# Patient Record
Sex: FEMALE | Race: WHITE | Employment: UNEMPLOYED | URBAN - METROPOLITAN AREA
[De-identification: names, ages, dates, MRNs, and addresses within clinical notes are randomized per-mention and may not be internally consistent; named-entity substitution may affect disease eponyms.]

---

## 2017-05-24 ENCOUNTER — HOSPITAL ENCOUNTER (OUTPATIENT)
Facility: HOSPITAL | Age: 52
Setting detail: OBSERVATION
Discharge: HOME | End: 2017-05-26
Attending: EMERGENCY MEDICINE | Admitting: INTERNAL MEDICINE
Payer: COMMERCIAL

## 2017-05-24 ENCOUNTER — TELEPHONE (OUTPATIENT)
Dept: MEDSURG UNIT | Facility: HOSPITAL | Age: 52
End: 2017-05-24

## 2017-05-24 DIAGNOSIS — G43.911 INTRACTABLE MIGRAINE WITH STATUS MIGRAINOSUS, UNSPECIFIED MIGRAINE TYPE: Primary | ICD-10-CM

## 2017-05-24 PROBLEM — Z91.81 AT RISK FOR FALLING: Status: ACTIVE | Noted: 2017-05-24

## 2017-05-24 RX ORDER — ONDANSETRON 2 MG/ML
4 INJECTION INTRAMUSCULAR; INTRAVENOUS EVERY 4 HOURS PRN
Status: DISCONTINUED | OUTPATIENT
Start: 2017-05-24 | End: 2017-05-26

## 2017-05-24 RX ORDER — ONDANSETRON 2 MG/ML
4 INJECTION INTRAMUSCULAR; INTRAVENOUS ONCE
Status: COMPLETED | OUTPATIENT
Start: 2017-05-24 | End: 2017-05-24

## 2017-05-24 RX ORDER — HYDROMORPHONE HYDROCHLORIDE 1 MG/ML
0.5 INJECTION, SOLUTION INTRAMUSCULAR; INTRAVENOUS; SUBCUTANEOUS EVERY 30 MIN PRN
Status: ACTIVE | OUTPATIENT
Start: 2017-05-24 | End: 2017-05-24

## 2017-05-24 RX ORDER — HYDROMORPHONE HYDROCHLORIDE 1 MG/ML
1 INJECTION, SOLUTION INTRAMUSCULAR; INTRAVENOUS; SUBCUTANEOUS ONCE
Status: COMPLETED | OUTPATIENT
Start: 2017-05-24 | End: 2017-05-24

## 2017-05-24 RX ORDER — GABAPENTIN 300 MG/1
300 CAPSULE ORAL 3 TIMES DAILY
Status: DISCONTINUED | OUTPATIENT
Start: 2017-05-24 | End: 2017-05-26

## 2017-05-24 RX ORDER — TOPIRAMATE 25 MG/1
75 TABLET ORAL EVERY 12 HOURS SCHEDULED
Status: DISCONTINUED | OUTPATIENT
Start: 2017-05-24 | End: 2017-05-26

## 2017-05-24 RX ORDER — CYCLOBENZAPRINE HCL 10 MG
10 TABLET ORAL NIGHTLY
Status: DISCONTINUED | OUTPATIENT
Start: 2017-05-24 | End: 2017-05-26

## 2017-05-24 RX ORDER — KETOROLAC TROMETHAMINE 30 MG/ML
15 INJECTION, SOLUTION INTRAMUSCULAR; INTRAVENOUS ONCE
Status: COMPLETED | OUTPATIENT
Start: 2017-05-24 | End: 2017-05-24

## 2017-05-24 RX ORDER — PANTOPRAZOLE SODIUM 40 MG/1
40 TABLET, DELAYED RELEASE ORAL
Status: DISCONTINUED | OUTPATIENT
Start: 2017-05-25 | End: 2017-05-24

## 2017-05-24 RX ORDER — SODIUM CHLORIDE 9 MG/ML
INJECTION, SOLUTION INTRAVENOUS CONTINUOUS
Status: DISCONTINUED | OUTPATIENT
Start: 2017-05-24 | End: 2017-05-26

## 2017-05-24 RX ORDER — DEXAMETHASONE SODIUM PHOSPHATE 4 MG/ML
10 VIAL (ML) INJECTION ONCE
Status: COMPLETED | OUTPATIENT
Start: 2017-05-24 | End: 2017-05-24

## 2017-05-24 RX ORDER — PANTOPRAZOLE SODIUM 40 MG/1
40 TABLET, DELAYED RELEASE ORAL
Status: DISCONTINUED | OUTPATIENT
Start: 2017-05-24 | End: 2017-05-26

## 2017-05-24 RX ORDER — DIPHENHYDRAMINE HYDROCHLORIDE 50 MG/ML
25 INJECTION INTRAMUSCULAR; INTRAVENOUS ONCE
Status: COMPLETED | OUTPATIENT
Start: 2017-05-24 | End: 2017-05-24

## 2017-05-24 RX ORDER — METHYLPREDNISOLONE SODIUM SUCCINATE 125 MG/2ML
125 INJECTION, POWDER, LYOPHILIZED, FOR SOLUTION INTRAMUSCULAR; INTRAVENOUS ONCE
Status: COMPLETED | OUTPATIENT
Start: 2017-05-24 | End: 2017-05-24

## 2017-05-24 RX ORDER — ZOLPIDEM TARTRATE 10 MG/1
10 TABLET ORAL NIGHTLY PRN
Status: DISCONTINUED | OUTPATIENT
Start: 2017-05-24 | End: 2017-05-26

## 2017-05-24 RX ORDER — LORAZEPAM 2 MG/ML
1 INJECTION INTRAMUSCULAR ONCE
Status: COMPLETED | OUTPATIENT
Start: 2017-05-24 | End: 2017-05-24

## 2017-05-24 RX ORDER — DICYCLOMINE HYDROCHLORIDE 10 MG/1
10 CAPSULE ORAL
COMMUNITY
End: 2017-05-30

## 2017-05-24 RX ORDER — HYDROMORPHONE HYDROCHLORIDE 1 MG/ML
0.5 INJECTION, SOLUTION INTRAMUSCULAR; INTRAVENOUS; SUBCUTANEOUS EVERY 2 HOUR PRN
Status: COMPLETED | OUTPATIENT
Start: 2017-05-24 | End: 2017-05-25

## 2017-05-24 RX ORDER — ENOXAPARIN SODIUM 100 MG/ML
40 INJECTION SUBCUTANEOUS DAILY
Status: DISCONTINUED | OUTPATIENT
Start: 2017-05-24 | End: 2017-05-26

## 2017-05-24 NOTE — PLAN OF CARE
Problem: PAIN - ADULT  Goal: Verbalizes/displays adequate comfort level or patient’s stated pain goal  INTERVENTIONS:  - Encourage pt to monitor pain and request assistance  - Assess pain using appropriate pain scale  - Administer analgesics based on type to be responsible for managing their own health  - Refer to Case Management Department for coordinating discharge planning if the patient needs post-hospital services based on physician/LIP order or complex needs related to functional status, cognitive ramsey aphasic patients to use assistive/communication devices  Outcome: Progressing    Problem: GASTROINTESTINAL - ADULT  Goal: Minimal or absence of nausea and vomiting  INTERVENTIONS:  - Maintain adequate hydration with IV or PO as ordered and tolerated  - Julien

## 2017-05-24 NOTE — ED INITIAL ASSESSMENT (HPI)
Patient is a 55 yo  female with c/o migraine over the last 2 weeks. Patient states that she was evaluated at [facility] on Friday. Patient states that she was given zofran, toradol, and benadryl with minimal relief.  Patient rates pain as

## 2017-05-24 NOTE — ED NOTES
Round on pt. Pt requesting more blankets. Lights remain off in room.  Headache pain remains the same

## 2017-05-24 NOTE — ED PROVIDER NOTES
Patient Seen in: [facility] Emergency Department    History   Patient presents with:  Headache (neurologic)    Stated Complaint: migraine    HPI    42-year-old female complaining of headache on the patient has a history of chronic migraine headaches COCCYX;  Surgeon: Melanie Ashley MD;  Location: [facility]    M-SEDAJ BY [facility] [address] [facility] [address]  [address] [facility] 5+ YR N/A 8/19/2015    Comment Procedure: COCCYX;  Surgeon: Melanie Ashley MD;  Location: [address] [place] [address] Smoker                      Smokeless Status: Never Used                        Alcohol Use: No                Review of Systems    Positive for stated complaint: migraine  Other systems are as noted in HPI. Constitutional and vital signs reviewed.       A Dilaudid and I contacted Asad Avendnao from neurology who recommended IV Depakote as well as magnesium the patient became upset because it would not give her a second dose of Dilaudid which was very briefly after getting the first dose of Dilaudid and refuse

## 2017-05-25 RX ORDER — HYDROCODONE BITARTRATE AND ACETAMINOPHEN 5; 325 MG/1; MG/1
1 TABLET ORAL EVERY 4 HOURS PRN
Status: DISCONTINUED | OUTPATIENT
Start: 2017-05-25 | End: 2017-05-26

## 2017-05-25 RX ORDER — ALPRAZOLAM 0.5 MG/1
0.5 TABLET ORAL AS NEEDED
Status: DISCONTINUED | OUTPATIENT
Start: 2017-05-25 | End: 2017-05-25

## 2017-05-25 RX ORDER — ESCITALOPRAM OXALATE 10 MG/1
20 TABLET ORAL DAILY
Status: DISCONTINUED | OUTPATIENT
Start: 2017-05-25 | End: 2017-05-26

## 2017-05-25 RX ORDER — DICYCLOMINE HYDROCHLORIDE 10 MG/1
10 CAPSULE ORAL
Status: DISCONTINUED | OUTPATIENT
Start: 2017-05-25 | End: 2017-05-26

## 2017-05-25 RX ORDER — BUPROPION HYDROCHLORIDE 150 MG/1
150 TABLET, EXTENDED RELEASE ORAL
Status: DISCONTINUED | OUTPATIENT
Start: 2017-05-25 | End: 2017-05-26

## 2017-05-25 RX ORDER — LORAZEPAM 2 MG/ML
2 INJECTION INTRAMUSCULAR ONCE
Status: COMPLETED | OUTPATIENT
Start: 2017-05-25 | End: 2017-05-25

## 2017-05-25 RX ORDER — HYDROCODONE BITARTRATE AND ACETAMINOPHEN 5; 325 MG/1; MG/1
2 TABLET ORAL EVERY 4 HOURS PRN
Status: DISCONTINUED | OUTPATIENT
Start: 2017-05-25 | End: 2017-05-26

## 2017-05-25 RX ORDER — PANTOPRAZOLE SODIUM 40 MG/1
40 TABLET, DELAYED RELEASE ORAL
Status: DISCONTINUED | OUTPATIENT
Start: 2017-05-25 | End: 2017-05-25

## 2017-05-25 RX ORDER — ALPRAZOLAM 0.5 MG/1
0.5 TABLET ORAL 2 TIMES DAILY PRN
Status: DISCONTINUED | OUTPATIENT
Start: 2017-05-25 | End: 2017-05-26

## 2017-05-25 NOTE — PROGRESS NOTES
Pt states she takes Topamax 25mg TID. RN attempted to contact pts pharmacy to verify Rx, however they were closed. Will attempt at 1000 when pharmacy opens. 1038: RN spoke with pts' pharmacy. Topamax is prescribed as 75mg BID.

## 2017-05-25 NOTE — PLAN OF CARE
Altered Communication/Language Barrier    • Patient/Family is able to understand and participate in their care Progressing        GASTROINTESTINAL - ADULT    • Minimal or absence of nausea and vomiting Progressing        NEUROLOGICAL - ADULT    • Achieves

## 2017-05-25 NOTE — PLAN OF CARE
A/Ox4. Flat affect, withdrawn. Slightly drowsy. C/o HA. New orders for Norco for pain mgt. Had Mag 2gm IV this AM. Also receiving IV steroids. IVF infusing. Up with assist, slightly unsteady on her feet. Staff will cont to monitor.      1232: Rounded

## 2017-05-25 NOTE — PROGRESS NOTES
Neurology follow up NOTE    SUBJECTIVE:  She did not sleep well last night even with Ambien. Her headache has improved but still has right frontal region pain. She is eating her dinner.  She slept during the day until 4:30 pm. She did tell me she has a lot tomorrow.

## 2017-05-25 NOTE — PLAN OF CARE
Pt transported to  3619 via wheelchair accompanied by transport staff. Chart sent. Medications sent. Belongings sent.

## 2017-05-25 NOTE — PROGRESS NOTES
RECEIVED FROM SAME DAY SURGERY. PT A&OX4. C/O OF HEADACHE/BACK PAIN (CHRONIC). IVF INFUSING AS ORDERED. PT ORIENTED TO ROOM AND USE OF CALL LIGHT. VSS. FALL PRECAUTIONS INITIATED. WILL CONTINUE TO MONITOR. LABS AND MEDS AS ORDERED.  PAIN MEDS AS ORDERED P

## 2017-05-25 NOTE — PAYOR COMM NOTE
Attending Physician: Cornell Machado,*    5/24    Ed       Patient presents with:  Headache (neurologic)    Stated Complaint: migraine    HPI    68-year-old female complaining of headache on the patient has a history of chronic migraine headaches s Glucose 108 (*)     Chloride 112 (*)     CO2 20.0 (*)     All other components within normal limits   BASIC METABOLIC PANEL (8) - Abnormal; Notable for the following:     Glucose 180 (*)     Chloride 113 (*)     CO2 21.0 (*)     All other components wit

## 2017-05-25 NOTE — BH PROGRESS NOTE
[facility] Resource Referral Counselor Note    Author Prime Patient Status:  Observation    1965 MRN [id number]   [facility] 3NE-A Attending Tejas Arndt,*   Hosp Day # 1 PCP None Pcp       S(subjective) Pt

## 2017-05-25 NOTE — PROGRESS NOTES
[facility] Hospitalist Progress Note                                                                   [place] 46  8/25/1965    CC: FU HA    Interval History:  - HA is a bit improved today LN in the left axilla and mild tenderness to palpation bottom of the left breast, no hard mass palpated      Pertinent Labs:   Recent Labs   Lab  05/25/17   0535   RBC  4.22   HGB  12.4   HCT  38.2   MCV  90.5   MCH  29.4   MCHC  32.5   RDW  12.7   NEPRELI

## 2017-05-25 NOTE — CONSULTS
[facility]    PATIENT'S NAME: Brenna Thompson   ATTENDING PHYSICIAN: Angelica Sarmiento. HERMILA Reeves Alexandrea: Jyothi Escamilla M.D.    PATIENT ACCOUNT#:   [de-identified]    LOCATION:  [address] [place] [address]  MEDICAL RECORD #:   [id number]       DATE OF different from her previous severe migraine attack. She had a DHE in the past which helped her very well but currently Hospital does not have DHE available for treatment. She also stated she is allergic to Depakote; it makes her sick.   She also stated th tone normal.  Strength 5/5. Deep tendon reflexes 2+ symmetric bilateral.  Coordination finger-to-nose, heel-to-shin normal.  Gait was not checked.      LABORATORY DATA:  She had her brain MRI in November 2016 which was normal.    IMPRESSION:  The patient p

## 2017-05-25 NOTE — H&P
General Medicine H&P     Patient presents with:  Headache (neurologic)       PCP: None Pcp, pt reports follows c Dr. Rosa Maurer    History of Present Illness: Patient is a 54year old female with PMH sig for asthma, IBS, depression, anxiety, GERD, FM, migrai Location: [facility]    OTHER      Comment bladder sling    OTHER      Comment jaw surgery for TMJ        ALL:    Crabs (Crustaceans)         Comment:Swelling of throat, vomitting.   Dairy Products            Doxycycline d/w Dr. Gamboa Precise  -IV solumdedrol, ativan, benadryl, magnesium, zofran,  -topiramate, depacon  -IV dilaudid, minimize narcotics    Back Pain  -flexeril/gabapentin    Breast/axillary pain  -no large nodules/masses noted on breast exam  -o/p mammogram, pt repor

## 2017-05-26 VITALS
BODY MASS INDEX: 23 KG/M2 | WEIGHT: 149.94 LBS | OXYGEN SATURATION: 95 % | DIASTOLIC BLOOD PRESSURE: 84 MMHG | HEART RATE: 82 BPM | SYSTOLIC BLOOD PRESSURE: 110 MMHG | RESPIRATION RATE: 16 BRPM | TEMPERATURE: 98 F

## 2017-05-26 PROCEDURE — 90792 PSYCH DIAG EVAL W/MED SRVCS: CPT | Performed by: OTHER

## 2017-05-26 RX ORDER — GABAPENTIN 300 MG/1
300 CAPSULE ORAL 3 TIMES DAILY
Qty: 90 CAPSULE | Refills: 3 | Status: SHIPPED | OUTPATIENT
Start: 2017-05-26 | End: 2017-11-16

## 2017-05-26 RX ORDER — PREDNISONE 20 MG/1
TABLET ORAL
Qty: 14 TABLET | Refills: 0 | Status: SHIPPED | OUTPATIENT
Start: 2017-05-26 | End: 2017-11-16

## 2017-05-26 RX ORDER — PREDNISONE 20 MG/1
20 TABLET ORAL
Status: DISCONTINUED | OUTPATIENT
Start: 2017-05-27 | End: 2017-05-26

## 2017-05-26 RX ORDER — RIZATRIPTAN BENZOATE 10 MG/1
10 TABLET ORAL AS NEEDED
Qty: 9 TABLET | Refills: 1 | Status: SHIPPED | OUTPATIENT
Start: 2017-05-26 | End: 2017-06-30

## 2017-05-26 RX ORDER — BUPROPION HYDROCHLORIDE 150 MG/1
150 TABLET, EXTENDED RELEASE ORAL 2 TIMES DAILY
Qty: 60 TABLET | Refills: 0 | Status: SHIPPED | OUTPATIENT
Start: 2017-05-26 | End: 2018-12-05

## 2017-05-26 NOTE — PHYSICAL THERAPY NOTE
PHYSICAL THERAPY QUICK EVALUATION - INPATIENT    Room Number: 4854/1414-C  Evaluation Date: 5/26/2017  Presenting Problem: intractable migraines and low back pain  Physician Order: PT Eval and Treat    Problem List  Principal Problem:    Intractable migrai Home: House   Home Layout: Two level  Stairs to Enter : 1     Stairs to Bedroom: 16  Railing: Yes    Lives With: Alone  Drives: Yes          Prior Level of [place]: Independent community ambulator (limited since work injury ~ one year ago), independen Degree of Impairment Score: 0%   Standardized Score (AM-PAC Scale): 61.14   CMS Modifier (G-Code): CH      FUNCTIONAL ABILITY STATUS  Gait Assessment  Gait Assistance: Modified independent  Distance (ft): 300 (one seated rest x 3' after 150ft)  Assistive D in gait velocity, i.e., minor disruption to smooth gait path or uses walking aid. (1) Moderate Impairment: Preforms head turns with moderate change in gait velocity, slows down, staggers but recovers, can continue to walk.   (0)  Severe Impairment: Preform for Intractable migraine with status migrainosus, unspecified migraine type [G43.911]. Pt presents to PT evaluation at her baseline level of mobility with ~ 0% degree of functional impairment as indicated by AM-PAC score.  Pt scored 8/12 on the 4 item DGI

## 2017-05-26 NOTE — PROGRESS NOTES
Looks much better today - HA improved, less anxious, more awake and alert. Suspect her insomnia and jitteriness yesterday was 2/2 steroid effect    She is OK for DC today from my perspective if OK with neuro and psych.     Outpatient FU with PCP for mammo

## 2017-05-26 NOTE — PLAN OF CARE
Patient A&O x4, c/o back pain, very weepy and depressed acting. She wants social work to call her boss as proof that she is in the hospital.  She walked through the unit on to separate occasions.   She claimed to have a lot of trouble sleeping and requeste
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
## 2017-05-26 NOTE — PLAN OF CARE
NURSING DISCHARGE NOTE    Discharged Home via Wheelchair. Accompanied by Friend and Support staff  Belongings Taken by patient/family. Discharge paperwork including 1 printed prescription given to & discussed with pt PTD.  Pt verbalized understandin

## 2017-05-26 NOTE — DISCHARGE SUMMARY
General Medicine Discharge Summary     Patient ID:  Luis Kruger  54year old  8/25/1965    Admit date: 5/24/2017    Discharge date and time:  5/26/17    Attending Physician: Chris Merino HOSPITALIST  IP CONSULT TO PSYCHIATRY    Operative Procedures:         Patient instructions:         Activity: activity as tolerated  Diet: regular diet  Wound Care: as directed  Code Status: Full Code      Exam on day of discharge:      05/26/17  1249   BP

## 2017-05-26 NOTE — PROGRESS NOTES
Neurology follow up NOTE    SUBJECTIVE:  Her headache has improved, she has less nausea and mild right frontal headache only. She slept well last night. She felt Solumedrol helps her a lot. She still has lower back pain but better than when she came in.

## 2017-06-26 ENCOUNTER — HOSPITAL ENCOUNTER (OUTPATIENT)
Age: 52
Discharge: HOME | End: 2017-06-26
Attending: FAMILY MEDICINE
Payer: COMMERCIAL

## 2017-06-26 VITALS
RESPIRATION RATE: 18 BRPM | TEMPERATURE: 98 F | HEART RATE: 80 BPM | HEIGHT: 67.5 IN | BODY MASS INDEX: 23.27 KG/M2 | WEIGHT: 150 LBS | SYSTOLIC BLOOD PRESSURE: 110 MMHG | DIASTOLIC BLOOD PRESSURE: 79 MMHG | OXYGEN SATURATION: 98 %

## 2017-06-26 DIAGNOSIS — M54.31 SCIATICA OF RIGHT SIDE: ICD-10-CM

## 2017-06-26 DIAGNOSIS — M54.50 LOW BACK PAIN AT MULTIPLE SITES: ICD-10-CM

## 2017-06-26 DIAGNOSIS — Z86.69 HISTORY OF MIGRAINE HEADACHES: ICD-10-CM
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
## 2017-06-26 DIAGNOSIS — S46.819A TRAPEZIUS STRAIN, UNSPECIFIED LATERALITY, INITIAL ENCOUNTER: ICD-10-CM

## 2017-06-26 PROCEDURE — 99214 OFFICE O/P EST MOD 30 MIN: CPT

## 2017-06-26 PROCEDURE — 96372 THER/PROPH/DIAG INJ SC/IM: CPT

## 2017-06-26 RX ORDER — METAXALONE 800 MG/1
TABLET ORAL 3 TIMES DAILY
Qty: 15 TABLET | Refills: 0 | Status: SHIPPED | OUTPATIENT
Start: 2017-06-26 | End: 2017-07-01

## 2017-06-26 RX ORDER — RIZATRIPTAN BENZOATE 10 MG/1
TABLET ORAL AS NEEDED
Qty: 10 TABLET | Refills: 0 | Status: SHIPPED | OUTPATIENT
Start: 2017-06-26 | End: 2017-06-29

## 2017-06-26 RX ORDER — KETOROLAC TROMETHAMINE 30 MG/ML
30 INJECTION, SOLUTION INTRAMUSCULAR; INTRAVENOUS ONCE
Status: COMPLETED | OUTPATIENT
Start: 2017-06-26 | End: 2017-06-26
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
## 2018-09-22 ENCOUNTER — HOSPITAL ENCOUNTER (OUTPATIENT)
Age: 53
Discharge: HOME | End: 2018-09-22
Payer: MEDICAID

## 2018-09-22 VITALS
RESPIRATION RATE: 18 BRPM | TEMPERATURE: 97 F | HEART RATE: 62 BPM | DIASTOLIC BLOOD PRESSURE: 72 MMHG | SYSTOLIC BLOOD PRESSURE: 112 MMHG | BODY MASS INDEX: 23.27 KG/M2 | WEIGHT: 150 LBS | HEIGHT: 67.5 IN | OXYGEN SATURATION: 98 %

## 2018-09-22 DIAGNOSIS — J01.90 ACUTE SINUSITIS, RECURRENCE NOT SPECIFIED, UNSPECIFIED LOCATION: Primary | ICD-10-CM

## 2018-09-22 DIAGNOSIS — G43.101 MIGRAINE WITH AURA AND WITH STATUS MIGRAINOSUS, NOT INTRACTABLE: ICD-10-CM

## 2018-09-22 PROCEDURE — 96361 HYDRATE IV INFUSION ADD-ON: CPT

## 2018-09-22 PROCEDURE — 99214 OFFICE O/P EST MOD 30 MIN: CPT

## 2018-09-22 PROCEDURE — 96374 THER/PROPH/DIAG INJ IV PUSH: CPT

## 2018-09-22 PROCEDURE — 96375 TX/PRO/DX INJ NEW DRUG ADDON: CPT

## 2018-09-22 RX ORDER — AMOXICILLIN AND CLAVULANATE POTASSIUM 875; 125 MG/1; MG/1
1 TABLET, FILM COATED ORAL 2 TIMES DAILY
Qty: 20 TABLET | Refills: 0 | Status: SHIPPED | OUTPATIENT
Start: 2018-09-22 | End: 2018-10-02

## 2018-09-22 RX ORDER — DIPHENHYDRAMINE HYDROCHLORIDE 50 MG/ML
25 INJECTION INTRAMUSCULAR; INTRAVENOUS ONCE
Status: COMPLETED | OUTPATIENT
Start: 2018-09-22 | End: 2018-09-22

## 2018-09-22 RX ORDER — ONDANSETRON 4 MG/1
4 TABLET, ORALLY DISINTEGRATING ORAL EVERY 4 HOURS PRN
Qty: 20 TABLET | Refills: 0 | Status: ON HOLD | OUTPATIENT
Start: 2018-09-22 | End: 2018-11-19

## 2018-09-22 RX ORDER — ONDANSETRON 2 MG/ML
4 INJECTION INTRAMUSCULAR; INTRAVENOUS ONCE
Status: COMPLETED | OUTPATIENT
Start: 2018-09-22 | End: 2018-09-22

## 2018-09-22 RX ORDER — SODIUM CHLORIDE 9 MG/ML
1000 INJECTION, SOLUTION INTRAVENOUS ONCE
Status: COMPLETED | OUTPATIENT
Start: 2018-09-22 | End: 2018-09-22

## 2018-09-22 RX ORDER — KETOROLAC TROMETHAMINE 30 MG/ML
30 INJECTION, SOLUTION INTRAMUSCULAR; INTRAVENOUS ONCE
Status: COMPLETED | OUTPATIENT
Start: 2018-09-22 | End: 2018-09-22

## 2018-09-22 NOTE — ED INITIAL ASSESSMENT (HPI)
C/o migraine headache for 3 days, today is 4th day. Felt shaking, leg pain left worse than right. Light sensitive. Pressure to eyes and pressure to right side of the head. Felt dizzy.

## 2018-09-22 NOTE — ED PROVIDER NOTES
Patient Seen in: [facility]    History   Patient presents with:  Headache    Stated Complaint: MIGRAINE/WEEK/BODY PAIN    HPI    61-year-old female with a history of migraines here with complaint of intractable migraine for the HYSTERECTOMY  1/19/2015: CARMEN BY  [facility]YS [facility] SVC 5+ YR; N/A      Comment:  Procedure: COCCYX;  Surgeon: Bryan Sawyer MD;                 Location: [address] [place] [address] MANAGEMENT  8/19/2015: CARMEN BY  PHYS [address] [facility] 59  N SVC 5+ YR; N/A      Comment: frontal sinus tenderness. Mouth/Throat: Uvula is midline, oropharynx is clear and moist and mucous membranes are normal.   Eyes: Conjunctivae and EOM are normal. Pupils are equal, round, and reactive to light. Neck: Normal range of motion. Neck supple. Tablet Dispersible  Take 1 tablet (4 mg total) by mouth every 4 (four) hours as needed for Nausea. Qty: 20 tablet Refills: 0    !! - Potential duplicate medications found. Please discuss with provider.

## 2018-11-08 ENCOUNTER — HOSPITAL ENCOUNTER (OUTPATIENT)
Age: 53
Discharge: HOME | End: 2018-11-08
Payer: MEDICAID

## 2018-11-08 VITALS
RESPIRATION RATE: 18 BRPM | HEART RATE: 71 BPM | TEMPERATURE: 98 F | SYSTOLIC BLOOD PRESSURE: 144 MMHG | DIASTOLIC BLOOD PRESSURE: 92 MMHG | OXYGEN SATURATION: 98 % | WEIGHT: 150 LBS | HEIGHT: 67.5 IN | BODY MASS INDEX: 23.27 KG/M2

## 2018-11-08 DIAGNOSIS — L30.9 DERMATITIS: ICD-10-CM

## 2018-11-08 DIAGNOSIS — J01.00 ACUTE NON-RECURRENT MAXILLARY SINUSITIS: Primary | ICD-10-CM

## 2018-11-08 PROCEDURE — 99213 OFFICE O/P EST LOW 20 MIN: CPT

## 2018-11-08 PROCEDURE — 82962 GLUCOSE BLOOD TEST: CPT

## 2018-11-08 PROCEDURE — 99214 OFFICE O/P EST MOD 30 MIN: CPT

## 2018-11-08 RX ORDER — AMOXICILLIN AND CLAVULANATE POTASSIUM 875; 125 MG/1; MG/1
1 TABLET, FILM COATED ORAL 2 TIMES DAILY
Qty: 20 TABLET | Refills: 0 | Status: SHIPPED | OUTPATIENT
Start: 2018-11-08 | End: 2018-11-20

## 2018-11-08 NOTE — ED INITIAL ASSESSMENT (HPI)
Swelling to throat glands x 2 weeks with a sore throat for 4-5 days. Patient also complains of a cough and facial pressure to the left side of the face. Patient also complains of dry skin with sores to both feet x 3 months.  Patient denies fever however, do

## 2018-11-08 NOTE — ED PROVIDER NOTES
Patient Seen in: THE [facility] In [place]    History   Patient presents with:  Cough/URI: onset 4-5 days  Rash Skin Problem (integumentary): both feet x 3 months   Sore Throat: onset 4-5 days     Stated Complaint: sores on feet/upper respirat MANAGEMENT   • DRAIN/INJECT MEDIUM JOINT/BURSA N/A 8/19/2015    Procedure: COCCYX;  Surgeon: Leslie Tello MD;  Location: [address] [place] [address]   • FLUOROSCOPIC GUIDANCE NEEDLE PLACEMENT N/A 1/19/2015    Procedure: COCCYX;  Surgeon: Jane Amador Tympanic membrane is bulging. Left Ear: Ear canal normal. Tympanic membrane is bulging. Nose: Mucosal edema present. Right sinus exhibits frontal sinus tenderness. Right sinus exhibits no maxillary sinus tenderness.  Left sinus exhibits frontal sinus te endorse having minor nosebleeds associated with her sinusitis symptoms. I explained that she should use a humidifier and Vaseline to the nasal passages to help prevent epistaxis. The patient is encouraged to return if any concerning symptoms arise.  Addit

## 2018-11-15 ENCOUNTER — HOSPITAL ENCOUNTER (OUTPATIENT)
Age: 53
Discharge: HOME | End: 2018-11-15
Payer: MEDICAID

## 2018-11-15 VITALS
DIASTOLIC BLOOD PRESSURE: 75 MMHG | HEART RATE: 79 BPM | SYSTOLIC BLOOD PRESSURE: 118 MMHG | RESPIRATION RATE: 16 BRPM | OXYGEN SATURATION: 100 %

## 2018-11-15 DIAGNOSIS — G43.109 MIGRAINE WITH AURA AND WITHOUT STATUS MIGRAINOSUS, NOT INTRACTABLE: Primary | ICD-10-CM

## 2018-11-15 DIAGNOSIS — J40 BRONCHITIS: ICD-10-CM

## 2018-11-15 PROCEDURE — 94640 AIRWAY INHALATION TREATMENT: CPT

## 2018-11-15 PROCEDURE — 96361 HYDRATE IV INFUSION ADD-ON: CPT

## 2018-11-15 PROCEDURE — 99215 OFFICE O/P EST HI 40 MIN: CPT

## 2018-11-15 PROCEDURE — 96375 TX/PRO/DX INJ NEW DRUG ADDON: CPT

## 2018-11-15 PROCEDURE — 96374 THER/PROPH/DIAG INJ IV PUSH: CPT

## 2018-11-15 PROCEDURE — 96372 THER/PROPH/DIAG INJ SC/IM: CPT

## 2018-11-15 PROCEDURE — 99214 OFFICE O/P EST MOD 30 MIN: CPT

## 2018-11-15 RX ORDER — DEXAMETHASONE SODIUM PHOSPHATE 4 MG/ML
10 VIAL (ML) INJECTION ONCE
Status: COMPLETED | OUTPATIENT
Start: 2018-11-15 | End: 2018-11-15

## 2018-11-15 RX ORDER — ONDANSETRON 2 MG/ML
4 INJECTION INTRAMUSCULAR; INTRAVENOUS ONCE
Status: COMPLETED | OUTPATIENT
Start: 2018-11-15 | End: 2018-11-15

## 2018-11-15 RX ORDER — DIPHENHYDRAMINE HYDROCHLORIDE 50 MG/ML
25 INJECTION INTRAMUSCULAR; INTRAVENOUS ONCE
Status: COMPLETED | OUTPATIENT
Start: 2018-11-15 | End: 2018-11-15

## 2018-11-15 RX ORDER — DEXAMETHASONE SODIUM PHOSPHATE 4 MG/ML
10 VIAL (ML) INJECTION ONCE
Status: DISCONTINUED | OUTPATIENT
Start: 2018-11-15 | End: 2018-11-15

## 2018-11-15 RX ORDER — KETOROLAC TROMETHAMINE 30 MG/ML
15 INJECTION, SOLUTION INTRAMUSCULAR; INTRAVENOUS ONCE
Status: COMPLETED | OUTPATIENT
Start: 2018-11-15 | End: 2018-11-15

## 2018-11-15 RX ORDER — IPRATROPIUM BROMIDE AND ALBUTEROL SULFATE 2.5; .5 MG/3ML; MG/3ML
3 SOLUTION RESPIRATORY (INHALATION) ONCE
Status: COMPLETED | OUTPATIENT
Start: 2018-11-15 | End: 2018-11-15

## 2018-11-15 RX ORDER — ONDANSETRON 4 MG/1
4 TABLET, ORALLY DISINTEGRATING ORAL EVERY 4 HOURS PRN
Qty: 10 TABLET | Refills: 0 | Status: ON HOLD | OUTPATIENT
Start: 2018-11-15 | End: 2018-11-19

## 2018-11-15 RX ORDER — ALBUTEROL SULFATE 90 UG/1
2 AEROSOL, METERED RESPIRATORY (INHALATION) EVERY 4 HOURS PRN
Qty: 1 INHALER | Refills: 0 | Status: ON HOLD | OUTPATIENT
Start: 2018-11-15 | End: 2018-11-19

## 2018-11-15 RX ORDER — SUMATRIPTAN 6 MG/.5ML
6 INJECTION, SOLUTION SUBCUTANEOUS ONCE
Status: COMPLETED | OUTPATIENT
Start: 2018-11-15 | End: 2018-11-15

## 2018-11-15 RX ORDER — SODIUM CHLORIDE 9 MG/ML
1000 INJECTION, SOLUTION INTRAVENOUS ONCE
Status: COMPLETED | OUTPATIENT
Start: 2018-11-15 | End: 2018-11-15

## 2018-11-15 RX ORDER — PREDNISONE 20 MG/1
20 TABLET ORAL DAILY
Qty: 5 TABLET | Refills: 0 | Status: SHIPPED | OUTPATIENT
Start: 2018-11-15 | End: 2018-11-20

## 2018-11-15 RX ORDER — ALBUTEROL SULFATE 2.5 MG/3ML
2.5 SOLUTION RESPIRATORY (INHALATION) EVERY 4 HOURS PRN
Qty: 30 AMPULE | Refills: 0 | Status: SHIPPED | OUTPATIENT
Start: 2018-11-15 | End: 2018-12-05

## 2018-11-15 NOTE — ED NOTES
IV infusing well. Pt noted rocking self while laying on bed.  She states she feels a lot better  After breathing treatment

## 2018-11-15 NOTE — ED INITIAL ASSESSMENT (HPI)
Cough - x 2 weeks  Pt dx with sinusitis. No otc  meds taken. Pt on augmentin On day 7 of 10 for sinusitis. Pt has been taking inhaler .  pt states she has breathing machine at home but has no nebules,    Headache- x 3 days constant and  Also c/o light sens

## 2018-11-15 NOTE — ED PROVIDER NOTES
Patient Seen in: [facility] In [facility]    History   Patient presents with:  Cough  Vomiting  Headache (neurologic)  Dizziness    Stated Complaint: migraine / nausea / aches / breathing issue    HPI  Patient is a 22-year-old female that pre TOTAL ABDOM HYSTERECTOMY         Family history reviewed and is not pertinent to presenting problem.     Social History    Tobacco Use      Smoking status: Never Smoker      Smokeless tobacco: Never Used    Alcohol use: No    Drug use: No      Review of Sys throughout. Abdominal: Soft. Bowel sounds are normal. She exhibits no distension and no mass. There is no tenderness. There is no guarding. Lymphadenopathy:     She has no cervical adenopathy.    Neurological: She is alert and oriented to person, place did not take her Rizatriptan today so Imitrex 6 mg subcutaneous was also  given in our department. Administered DuoNeb's x2 with marked improvement in breathing and decreased coughing.     Discharge patient home with albuterol ampules for her nebulizer a

## 2018-11-15 NOTE — ED NOTES
Warm blanket and pillow provided. Pt made comfortable. Bed on low fowlers. Lights dimmed. 1 side rail up. Call light within reach.

## 2018-11-15 NOTE — ED NOTES
>Received patient per wheelchair to room, alert x oriented x 3, not in any distress. Pt here for cough, dizziness, vomiting and headache. Pt states noted with coughing fits. c/o shortness of breath, No labored breathing noted.  Pt has been using inhaler, on

## 2018-11-18 ENCOUNTER — HOSPITAL ENCOUNTER (OUTPATIENT)
Facility: HOSPITAL | Age: 53
Setting detail: OBSERVATION
Discharge: HOME | End: 2018-11-20
Attending: EMERGENCY MEDICINE | Admitting: HOSPITALIST
Payer: MEDICAID

## 2018-11-18 DIAGNOSIS — G43.711 INTRACTABLE CHRONIC MIGRAINE WITHOUT AURA AND WITH STATUS MIGRAINOSUS: Primary | ICD-10-CM

## 2018-11-18 DIAGNOSIS — Z56.6 STRESS AT WORK: ICD-10-CM

## 2018-11-18 DIAGNOSIS — G44.229 CHRONIC TENSION-TYPE HEADACHE, NOT INTRACTABLE: ICD-10-CM

## 2018-11-18 DIAGNOSIS — M54.2 NECK PAIN: ICD-10-CM

## 2018-11-18 DIAGNOSIS — R21 RASH AND NONSPECIFIC SKIN ERUPTION: ICD-10-CM

## 2018-11-18 DIAGNOSIS — K43.9 HERNIA OF ABDOMINAL WALL: ICD-10-CM

## 2018-11-18 DIAGNOSIS — IMO0002 CHRONIC MIGRAINE: ICD-10-CM

## 2018-11-18 PROCEDURE — 96376 TX/PRO/DX INJ SAME DRUG ADON: CPT

## 2018-11-18 PROCEDURE — 99285 EMERGENCY DEPT VISIT HI MDM: CPT

## 2018-11-18 PROCEDURE — 96375 TX/PRO/DX INJ NEW DRUG ADDON: CPT

## 2018-11-18 PROCEDURE — 96365 THER/PROPH/DIAG IV INF INIT: CPT

## 2018-11-18 PROCEDURE — 96361 HYDRATE IV INFUSION ADD-ON: CPT

## 2018-11-18 RX ORDER — KETOROLAC TROMETHAMINE 30 MG/ML
30 INJECTION, SOLUTION INTRAMUSCULAR; INTRAVENOUS ONCE
Status: COMPLETED | OUTPATIENT
Start: 2018-11-18 | End: 2018-11-18

## 2018-11-18 RX ORDER — DIPHENHYDRAMINE HYDROCHLORIDE 50 MG/ML
25 INJECTION INTRAMUSCULAR; INTRAVENOUS ONCE
Status: COMPLETED | OUTPATIENT
Start: 2018-11-18 | End: 2018-11-18

## 2018-11-18 RX ORDER — ONDANSETRON 2 MG/ML
4 INJECTION INTRAMUSCULAR; INTRAVENOUS ONCE
Status: COMPLETED | OUTPATIENT
Start: 2018-11-18 | End: 2018-11-18

## 2018-11-18 SDOH — HEALTH STABILITY - MENTAL HEALTH: OTHER PHYSICAL AND MENTAL STRAIN RELATED TO WORK: Z56.6

## 2018-11-19 PROBLEM — G43.711 INTRACTABLE CHRONIC MIGRAINE WITHOUT AURA AND WITH STATUS MIGRAINOSUS: Status: ACTIVE | Noted: 2018-11-19

## 2018-11-19 PROCEDURE — 85025 COMPLETE CBC W/AUTO DIFF WBC: CPT | Performed by: HOSPITALIST

## 2018-11-19 PROCEDURE — 80048 BASIC METABOLIC PNL TOTAL CA: CPT | Performed by: HOSPITALIST

## 2018-11-19 RX ORDER — ONDANSETRON 2 MG/ML
4 INJECTION INTRAMUSCULAR; INTRAVENOUS ONCE
Status: COMPLETED | OUTPATIENT
Start: 2018-11-19 | End: 2018-11-19

## 2018-11-19 RX ORDER — DEXAMETHASONE SODIUM PHOSPHATE 4 MG/ML
10 VIAL (ML) INJECTION ONCE
Status: DISCONTINUED | OUTPATIENT
Start: 2018-11-19 | End: 2018-11-19

## 2018-11-19 RX ORDER — BUPROPION HYDROCHLORIDE 150 MG/1
150 TABLET, EXTENDED RELEASE ORAL 2 TIMES DAILY
Status: DISCONTINUED | OUTPATIENT
Start: 2018-11-19 | End: 2018-11-20

## 2018-11-19 RX ORDER — TIZANIDINE 4 MG/1
4 TABLET ORAL 3 TIMES DAILY
Status: DISCONTINUED | OUTPATIENT
Start: 2018-11-19 | End: 2018-11-20

## 2018-11-19 RX ORDER — DIPHENHYDRAMINE HCL 50 MG
50 CAPSULE ORAL EVERY 6 HOURS PRN
Status: DISCONTINUED | OUTPATIENT
Start: 2018-11-19 | End: 2018-11-20

## 2018-11-19 RX ORDER — POLYETHYLENE GLYCOL 3350 17 G/17G
17 POWDER, FOR SOLUTION ORAL DAILY PRN
Status: DISCONTINUED | OUTPATIENT
Start: 2018-11-19 | End: 2018-11-20

## 2018-11-19 RX ORDER — KETOROLAC TROMETHAMINE 30 MG/ML
30 INJECTION, SOLUTION INTRAMUSCULAR; INTRAVENOUS EVERY 6 HOURS PRN
Status: DISCONTINUED | OUTPATIENT
Start: 2018-11-19 | End: 2018-11-19

## 2018-11-19 RX ORDER — DICYCLOMINE HYDROCHLORIDE 10 MG/1
10 CAPSULE ORAL 4 TIMES DAILY
Status: DISCONTINUED | OUTPATIENT
Start: 2018-11-19 | End: 2018-11-20

## 2018-11-19 RX ORDER — HYDROMORPHONE HYDROCHLORIDE 1 MG/ML
0.5 INJECTION, SOLUTION INTRAMUSCULAR; INTRAVENOUS; SUBCUTANEOUS ONCE
Status: COMPLETED | OUTPATIENT
Start: 2018-11-19 | End: 2018-11-19

## 2018-11-19 RX ORDER — DIHYDROERGOTAMINE MESYLATE 1 MG/ML
0.5 INJECTION, SOLUTION INTRAMUSCULAR; INTRAVENOUS; SUBCUTANEOUS EVERY 6 HOURS
Status: COMPLETED | OUTPATIENT
Start: 2018-11-19 | End: 2018-11-20

## 2018-11-19 RX ORDER — SODIUM CHLORIDE 9 MG/ML
INJECTION, SOLUTION INTRAVENOUS CONTINUOUS
Status: CANCELLED | OUTPATIENT
Start: 2018-11-19 | End: 2018-11-19

## 2018-11-19 RX ORDER — ACETAMINOPHEN 325 MG/1
650 TABLET ORAL EVERY 6 HOURS PRN
Status: DISCONTINUED | OUTPATIENT
Start: 2018-11-19 | End: 2018-11-20

## 2018-11-19 RX ORDER — SODIUM PHOSPHATE, DIBASIC AND SODIUM PHOSPHATE, MONOBASIC 7; 19 G/133ML; G/133ML
1 ENEMA RECTAL ONCE AS NEEDED
Status: DISCONTINUED | OUTPATIENT
Start: 2018-11-19 | End: 2018-11-20

## 2018-11-19 RX ORDER — PREDNISONE 20 MG/1
20 TABLET ORAL
Status: COMPLETED | OUTPATIENT
Start: 2018-11-19 | End: 2018-11-20

## 2018-11-19 RX ORDER — AMOXICILLIN AND CLAVULANATE POTASSIUM 875; 125 MG/1; MG/1
1 TABLET, FILM COATED ORAL 2 TIMES DAILY
Status: DISCONTINUED | OUTPATIENT
Start: 2018-11-19 | End: 2018-11-20

## 2018-11-19 RX ORDER — ONDANSETRON 2 MG/ML
4 INJECTION INTRAMUSCULAR; INTRAVENOUS EVERY 6 HOURS
Status: DISCONTINUED | OUTPATIENT
Start: 2018-11-19 | End: 2018-11-20

## 2018-11-19 RX ORDER — HYDROMORPHONE HYDROCHLORIDE 1 MG/ML
0.5 INJECTION, SOLUTION INTRAMUSCULAR; INTRAVENOUS; SUBCUTANEOUS EVERY 30 MIN PRN
Status: CANCELLED | OUTPATIENT
Start: 2018-11-19 | End: 2018-11-19

## 2018-11-19 RX ORDER — HYDROMORPHONE HYDROCHLORIDE 1 MG/ML
1 INJECTION, SOLUTION INTRAMUSCULAR; INTRAVENOUS; SUBCUTANEOUS EVERY 2 HOUR PRN
Status: DISCONTINUED | OUTPATIENT
Start: 2018-11-19 | End: 2018-11-19

## 2018-11-19 RX ORDER — KETOROLAC TROMETHAMINE 30 MG/ML
30 INJECTION, SOLUTION INTRAMUSCULAR; INTRAVENOUS EVERY 6 HOURS PRN
Status: DISCONTINUED | OUTPATIENT
Start: 2018-11-19 | End: 2018-11-20

## 2018-11-19 RX ORDER — MAGNESIUM SULFATE 1 G/100ML
1 INJECTION INTRAVENOUS ONCE
Status: COMPLETED | OUTPATIENT
Start: 2018-11-19 | End: 2018-11-19

## 2018-11-19 RX ORDER — BISACODYL 10 MG
10 SUPPOSITORY, RECTAL RECTAL
Status: DISCONTINUED | OUTPATIENT
Start: 2018-11-19 | End: 2018-11-20

## 2018-11-19 RX ORDER — HYDROMORPHONE HYDROCHLORIDE 1 MG/ML
0.5 INJECTION, SOLUTION INTRAMUSCULAR; INTRAVENOUS; SUBCUTANEOUS EVERY 2 HOUR PRN
Status: DISCONTINUED | OUTPATIENT
Start: 2018-11-19 | End: 2018-11-19

## 2018-11-19 RX ORDER — TOPIRAMATE 25 MG/1
75 TABLET ORAL 2 TIMES DAILY
Status: DISCONTINUED | OUTPATIENT
Start: 2018-11-19 | End: 2018-11-20

## 2018-11-19 RX ORDER — ESCITALOPRAM OXALATE 20 MG/1
20 TABLET ORAL DAILY
Status: DISCONTINUED | OUTPATIENT
Start: 2018-11-19 | End: 2018-11-20

## 2018-11-19 RX ORDER — SODIUM CHLORIDE 9 MG/ML
INJECTION, SOLUTION INTRAVENOUS CONTINUOUS
Status: DISCONTINUED | OUTPATIENT
Start: 2018-11-19 | End: 2018-11-20

## 2018-11-19 RX ORDER — GABAPENTIN 300 MG/1
300 CAPSULE ORAL 3 TIMES DAILY
Status: DISCONTINUED | OUTPATIENT
Start: 2018-11-19 | End: 2018-11-20

## 2018-11-19 RX ORDER — ONDANSETRON 2 MG/ML
4 INJECTION INTRAMUSCULAR; INTRAVENOUS EVERY 4 HOURS PRN
Status: CANCELLED | OUTPATIENT
Start: 2018-11-19

## 2018-11-19 RX ORDER — ONDANSETRON 2 MG/ML
4 INJECTION INTRAMUSCULAR; INTRAVENOUS EVERY 6 HOURS PRN
Status: DISCONTINUED | OUTPATIENT
Start: 2018-11-19 | End: 2018-11-19

## 2018-11-19 RX ORDER — ONDANSETRON 2 MG/ML
INJECTION INTRAMUSCULAR; INTRAVENOUS
Status: DISPENSED
Start: 2018-11-19 | End: 2018-11-19

## 2018-11-19 RX ORDER — DOCUSATE SODIUM 100 MG/1
100 CAPSULE, LIQUID FILLED ORAL 2 TIMES DAILY
Status: DISCONTINUED | OUTPATIENT
Start: 2018-11-19 | End: 2018-11-20

## 2018-11-19 RX ORDER — BUTALBITAL, ACETAMINOPHEN AND CAFFEINE 50; 325; 40 MG/1; MG/1; MG/1
1 TABLET ORAL EVERY 6 HOURS PRN
Status: DISCONTINUED | OUTPATIENT
Start: 2018-11-19 | End: 2018-11-20

## 2018-11-19 RX ORDER — ALBUTEROL SULFATE 2.5 MG/3ML
2.5 SOLUTION RESPIRATORY (INHALATION) EVERY 4 HOURS PRN
Status: DISCONTINUED | OUTPATIENT
Start: 2018-11-19 | End: 2018-11-20

## 2018-11-19 RX ORDER — PANTOPRAZOLE SODIUM 20 MG/1
20 TABLET, DELAYED RELEASE ORAL
Status: DISCONTINUED | OUTPATIENT
Start: 2018-11-19 | End: 2018-11-20

## 2018-11-19 RX ORDER — NORTRIPTYLINE HYDROCHLORIDE 25 MG/1
75 CAPSULE ORAL NIGHTLY
Status: DISCONTINUED | OUTPATIENT
Start: 2018-11-19 | End: 2018-11-20

## 2018-11-19 RX ORDER — DEXAMETHASONE SODIUM PHOSPHATE 4 MG/ML
4 VIAL (ML) INJECTION ONCE
Status: COMPLETED | OUTPATIENT
Start: 2018-11-19 | End: 2018-11-19

## 2018-11-19 NOTE — PROGRESS NOTES
2nd page sent to [facility]: Dr Hines Blood via perfect serve. \"Patient requesting something for pain/nausea/sleep and benadryl\". No new orders at this time. Waiting on return phone call.     Addendum    Orders: dilaudid 0.5 mg IVP , Benadryl 50 mg, nause

## 2018-11-19 NOTE — PLAN OF CARE
Pt admitted for observation d/t chronic migraine pain. VSS upon admission to room 332. Pt walked to bed from cart. NS started at 100 mL/hr per order. HSPTLST Dr. Oskar Kelly paged via perfect serve for orders.  Patient requesting something for pain, nausea an

## 2018-11-19 NOTE — H&P
[facility] Hospitalist H&P       CC: Patient presents with:  Headache (neurologic)       PCP: None Pcp    History of Present Illness:  Ms. May Sanderson is a 49 yo female with PMH of migraines, fibromyalgia, IBS, depression and anxiety who presented with intractable MG Oral Tablet Dispersible DISSOLVE 1 TABLET IN MOUTH ONE TIME A DAY AS NEEDED *max 2/day Disp: 9 tablet Rfl: 1   topiramate (TOPAMAX) 25 MG Oral Tab Take 3 tablets (75 mg total) by mouth 2 (two) times daily.  3 po bid Disp: 180 tablet Rfl: 2   Nortriptylin Never Smoker      Smokeless tobacco: Never Used    Alcohol use: No       Fam Hx  Family History   Problem Relation Age of Onset   • Heart Disease Maternal Grandmother    • Heart Disease Sister    • Cancer Neg    • Stroke Neg        Review of Systems  12 po c/s -- appreciate recs  - No dilaudid  - DHE, IV valproic acid  - IV toradol  - Home meds    # Bronchitis  - Continue augmentin, per patient 2 more days    # Depression/Anxiety  - Continue home meds    # IBS  - Home bentyl    # GERD  - Home PPI    # Hx Her

## 2018-11-19 NOTE — PAYOR COMM NOTE
--------------  ADMISSION REVIEW     Payor: [facility] PPO  Subscriber #:  [id number]  Authorization Number: N/A    Admit date: N/A  11/19/18       Admitting Physician: Jermaine Oleary DO  Attending Physician:  Jermaine Oleary DO  Primary Care Physician: Pcp, None REMOVAL GALLBLADDER     • TOTAL ABDOM HYSTERECTOMY         Review of Systems    Positive for stated complaint: headache  Other systems are as noted in HPI. Constitutional and vital signs reviewed.       All other systems reviewed and negative except as not improved. Patient will be admitted for further observation and evaluation by neurology.   Discussed with the patient the possibility of going home and follow-up with neurology as an outpatient but she feels that her pain is not adequately controlled and ou HYDROmorphone HCl (DILAUDID) 1 MG/ML injection 1 mg     Date Action Dose Route User    11/19/2018 9104 Given 1 mg Intravenous Ibrahima Valle RN      ketorolac tromethamine (TORADOL) 30 MG/ML injection 30 mg     Date Action Dose Route User    11/18/20

## 2018-11-19 NOTE — CONSULTS
[facility]    PATIENT'S NAME: Zulema Shelley   ATTENDING PHYSICIAN: HERMILA Matthew PHYSICIAN: Cordelia Barrios M.D.    PATIENT ACCOUNT#:   [de-identified]    LOCATION:  [address] [place] [address]  MEDICAL RECORD #:   [id number]       DATE OF BIRTH true allergy, allergic to morphine, Reglan, and Tylenol with codeine. SOCIAL HISTORY:  Nonsmoker, no alcohol. FAMILY HISTORY:  Noncontributory to History of Present Illness. REVIEW OF SYSTEMS:  The rest of her review of systems is negative. M.D.  d: 11/19/2018 08:56:30  t: 11/19/2018 09:25:39  [facility] [id number]  AD/

## 2018-11-19 NOTE — ED PROVIDER NOTES
Patient Seen in: [facility] Emergency Department    History   Patient presents with:  Headache (neurologic)    Stated Complaint: headache    HPI    31-year-old female with a history of anxiety, depression, fibromyalgia, asthma, IBS, migraine headach Pulse 82   Temp 98.3 °F (36.8 °C) (Temporal)   Resp 16   LMP 12/19/2013   SpO2 100%         Physical Exam    General: Alert and oriented. No acute distress. HEENT: Normocephalic. No evidence of trauma. Extraocular movements are intact.   Cardiovascular ex migrainosus  (primary encounter diagnosis)    Disposition:  Admit  11/19/2018  3:13 am    Follow-up:  No follow-up provider specified.       Medications Prescribed:  Current Discharge Medication List        Present on Admission  Date Reviewed: 8/7/2018

## 2018-11-20 VITALS
OXYGEN SATURATION: 96 % | TEMPERATURE: 98 F | HEART RATE: 75 BPM | SYSTOLIC BLOOD PRESSURE: 123 MMHG | RESPIRATION RATE: 18 BRPM | DIASTOLIC BLOOD PRESSURE: 78 MMHG

## 2018-11-20 RX ORDER — PROCHLORPERAZINE MALEATE 10 MG
5 TABLET ORAL EVERY 6 HOURS PRN
Status: DISCONTINUED | OUTPATIENT
Start: 2018-11-20 | End: 2018-11-20

## 2018-11-20 RX ORDER — RIZATRIPTAN BENZOATE 10 MG/1
TABLET, ORALLY DISINTEGRATING ORAL
Qty: 12 TABLET | Refills: 1 | Status: SHIPPED | OUTPATIENT
Start: 2018-11-20 | End: 2018-12-05

## 2018-11-20 RX ORDER — ONDANSETRON 4 MG/1
4 TABLET, FILM COATED ORAL EVERY 8 HOURS PRN
Qty: 30 TABLET | Refills: 0 | Status: SHIPPED | OUTPATIENT
Start: 2018-11-20 | End: 2019-04-01

## 2018-11-20 RX ORDER — BUTALBITAL, ACETAMINOPHEN AND CAFFEINE 50; 325; 40 MG/1; MG/1; MG/1
1-2 TABLET ORAL EVERY 4 HOURS PRN
Qty: 20 TABLET | Refills: 0 | Status: SHIPPED | OUTPATIENT
Start: 2018-11-20 | End: 2018-12-05

## 2018-11-20 RX ORDER — METHYLPREDNISOLONE 4 MG/1
TABLET ORAL
Qty: 1 PACKAGE | Refills: 0 | Status: SHIPPED | OUTPATIENT
Start: 2018-11-20 | End: 2018-12-05

## 2018-11-20 RX ORDER — DIHYDROERGOTAMINE MESYLATE 1 MG/ML
0.5 INJECTION, SOLUTION INTRAMUSCULAR; INTRAVENOUS; SUBCUTANEOUS EVERY 6 HOURS
Status: DISCONTINUED | OUTPATIENT
Start: 2018-11-20 | End: 2018-11-20

## 2018-11-20 NOTE — DISCHARGE SUMMARY
General Medicine Discharge Summary     Patient ID:  Mo Dunn  48year old  8/25/1965    Admit date: 11/18/2018    Discharge date and time: 11/20/2018  1:23 PM     Attending Physician: Yaquelin Grider MD    Primary Care Physician: None Pcp     R Print Script, Disp-30 tablet, R-0    Rizatriptan Benzoate 10 MG Oral Tablet Dispersible  DISSOLVE 1 TABLET IN MOUTH ONE TIME A DAY AS NEEDED *max 2/day, Print Script, Disp-12 tablet, R-1    methylPREDNISolone (MEDROL) 4 MG Oral Tablet Therapy Pack  Medrol (six) hours as needed., Script not printed, Disp-12 tablet, R-0    OnabotulinumtoxinA (BOTOX) 200 UNITS Injection Recon Soln  Inject 155 units IM into head,face,neck as per FDA guidelines every 12 weeks, Script not printed, Disp-200 Units, R-3Patient has a

## 2018-11-20 NOTE — PROGRESS NOTES
NURSING DISCHARGE NOTE    Discharged Home via Wheelchair. Accompanied by Support staff  Belongings Taken by patient/family. Vss. Pt a&ox3. Pt on ra with 02 sats wnl. Lungs cta. Pt denies difficulty breathing or sob. Pt denies chest pain.  Pt reports

## 2018-11-20 NOTE — PLAN OF CARE
Assumed care of this patient at 1900 from [facility]. Pt VSS at this time. Migraine pain treated wit scheduled and PRN medications. Pt tolerating new medication DHE and now on telemetry.     Pt up ad flaco; ambulated in the halls approximately 250 ft when she

## 2018-11-20 NOTE — PROGRESS NOTES
Pt reports feeling some relief in migraine after DHE and Depakote. Denies n/v at this time. Tolerated breakfast well. toradol and fioricet given this am with good relief. ivf infusing. poc updated with patient. Dr. Saranya Lowry paged regarding discharge.  Will

## 2018-11-20 NOTE — PAYOR COMM NOTE
--------------  CONTINUED STAY REVIEW    Payor: Fernando Rios #:  [id number]  Authorization Number: N/A    Admit date: 11/20/18  Admit time: 1114    Admitting Physician: Breann San DO  Attending Physician:  Lisa Ryder tablet     Date Action Dose Route User    11/20/2018 0827 Given 1 tablet Oral Malika Baez RN    11/19/2018 2053 Given 1 tablet Oral Melanie Peralta RN      Dicyclomine HCl (BENTYL) cap 10 mg     Date Action Dose Route User    11/20/2018 0827 Given Given 4 mg Intravenous Sixto Olivares RN    11/19/2018 1430 Given 4 mg Intravenous Daisy Teague RN      predniSONE (DELTASONE) tab 20 mg     Date Action Dose Route User    11/20/2018 0827 Given 20 mg Oral Kelsey Boland RN    11/19/2018 1130 G

## 2018-11-20 NOTE — PROGRESS NOTES
Kaitlin Perez is a 48year old female.    Patient presents with:  Headache (neurologic)    HPI:    Pt with status migraine  Feeling better but ha not totally resolved  DHE does help quite a bit w/o side effects    Still with some nausea        Allergi Normal     Agnosia/Apraxia:None     Fund of Knowledge:Normal     Mood and affect appropriate       CRANIAL NERVES:     Visual fields /VA : Normal     Fundi: No pepilledema     Pupils: ERRLA     EOM/lids NL     Facial sensation/Corneal: NL     Face (motor)

## 2018-11-21 NOTE — PAYOR COMM NOTE
--------------  DISCHARGE REVIEW    Payor: Fernando Rios #:  [id number]  Authorization Number: [id number]        Admitting Physician: Bar Hickman DO  Attending Physician:  No att. providers found  Primary Care Phy outpatient if causing symptoms    Consults: None    Operative Procedures:      Disposition: home    Patient Instructions:   Discharge Medication List as of 11/20/2018  1:14 PM    START taking these medications    !! Butalbital-APAP-Caffeine -40 MG Or meals. Decadron 2 mg along with Zofran 8 mg and Benadryl 25 mg q 6 hr for up to 72 hrs, Script not printed, Disp-12 tablet, R-0    gabapentin 300 MG Oral Cap  Take 1 capsule (300 mg total) by mouth 3 (three) times daily. , Normal, Disp-90 capsule, R-3    DI

## 2018-11-30 NOTE — PAYOR COMM NOTE
--------------  DISCHARGE REVIEW    Payor: Fernando Rios #:  [id number]  Authorization Number: [id number]    To ED 11/18/18  Discharge Date: 11/20/2018  1:23 PM  OBSERVATION STAY    Please note this is for OBS 11/18- meds     # IBS  - Home bentyl     # GERD  - Home PPI     # Hx Hernia  - Soft, reducible   - May need to f/u with surgery as outpatient if causing symptoms    Consults: None    Operative Procedures:      Disposition: home    Patient Instructions:   Tom Johnson daily., Normal, Disp-80 g, R-0    dexamethasone 2 MG Oral Tab  Take 1 tablet (2 mg total) by mouth 2 (two) times daily with meals.  Decadron 2 mg along with Zofran 8 mg and Benadryl 25 mg q 6 hr for up to 72 hrs, Script not printed, Disp-12 tablet, R-0    g

## 2018-12-05 ENCOUNTER — HOSPITAL ENCOUNTER (INPATIENT)
Facility: HOSPITAL | Age: 53
LOS: 1 days | Discharge: HOME | DRG: 103 | End: 2018-12-08
Attending: EMERGENCY MEDICINE | Admitting: HOSPITALIST
Payer: MEDICAID

## 2018-12-05 DIAGNOSIS — G43.711 INTRACTABLE CHRONIC MIGRAINE WITHOUT AURA WITH STATUS MIGRAINOSUS: Primary | ICD-10-CM

## 2018-12-05 PROCEDURE — 96375 TX/PRO/DX INJ NEW DRUG ADDON: CPT

## 2018-12-05 PROCEDURE — 85025 COMPLETE CBC W/AUTO DIFF WBC: CPT | Performed by: HOSPITALIST

## 2018-12-05 PROCEDURE — 96374 THER/PROPH/DIAG INJ IV PUSH: CPT

## 2018-12-05 PROCEDURE — 83735 ASSAY OF MAGNESIUM: CPT | Performed by: HOSPITALIST

## 2018-12-05 PROCEDURE — 99285 EMERGENCY DEPT VISIT HI MDM: CPT

## 2018-12-05 PROCEDURE — 80048 BASIC METABOLIC PNL TOTAL CA: CPT | Performed by: HOSPITALIST

## 2018-12-05 RX ORDER — HYDROCODONE BITARTRATE AND ACETAMINOPHEN 10; 325 MG/1; MG/1
2 TABLET ORAL 2 TIMES DAILY
COMMUNITY

## 2018-12-05 RX ORDER — SODIUM CHLORIDE 9 MG/ML
INJECTION, SOLUTION INTRAVENOUS CONTINUOUS
Status: ACTIVE | OUTPATIENT
Start: 2018-12-05 | End: 2018-12-05

## 2018-12-05 RX ORDER — ONDANSETRON 2 MG/ML
4 INJECTION INTRAMUSCULAR; INTRAVENOUS EVERY 6 HOURS PRN
Status: DISCONTINUED | OUTPATIENT
Start: 2018-12-05 | End: 2018-12-08

## 2018-12-05 RX ORDER — DIPHENHYDRAMINE HCL 25 MG
25 CAPSULE ORAL EVERY 6 HOURS PRN
Status: DISCONTINUED | OUTPATIENT
Start: 2018-12-05 | End: 2018-12-08

## 2018-12-05 RX ORDER — ACETAMINOPHEN 500 MG
1000 TABLET ORAL ONCE
Status: DISCONTINUED | OUTPATIENT
Start: 2018-12-05 | End: 2018-12-08

## 2018-12-05 RX ORDER — TOPIRAMATE 25 MG/1
75 TABLET ORAL 2 TIMES DAILY
Status: DISCONTINUED | OUTPATIENT
Start: 2018-12-05 | End: 2018-12-08

## 2018-12-05 RX ORDER — DICYCLOMINE HYDROCHLORIDE 10 MG/1
10 CAPSULE ORAL 3 TIMES DAILY PRN
Status: DISCONTINUED | OUTPATIENT
Start: 2018-12-05 | End: 2018-12-08

## 2018-12-05 RX ORDER — DEXAMETHASONE SODIUM PHOSPHATE 4 MG/ML
10 VIAL (ML) INJECTION ONCE
Status: COMPLETED | OUTPATIENT
Start: 2018-12-05 | End: 2018-12-05

## 2018-12-05 RX ORDER — ONDANSETRON 2 MG/ML
4 INJECTION INTRAMUSCULAR; INTRAVENOUS ONCE
Status: COMPLETED | OUTPATIENT
Start: 2018-12-05 | End: 2018-12-05

## 2018-12-05 RX ORDER — DOCUSATE SODIUM 100 MG/1
100 CAPSULE, LIQUID FILLED ORAL DAILY
COMMUNITY
End: 2019-11-03 | Stop reason: ALTCHOICE

## 2018-12-05 RX ORDER — HEPARIN SODIUM 5000 [USP'U]/ML
5000 INJECTION, SOLUTION INTRAVENOUS; SUBCUTANEOUS EVERY 8 HOURS SCHEDULED
Status: DISCONTINUED | OUTPATIENT
Start: 2018-12-05 | End: 2018-12-08

## 2018-12-05 RX ORDER — BUPROPION HYDROCHLORIDE 150 MG/1
150 TABLET, EXTENDED RELEASE ORAL 2 TIMES DAILY
Status: DISCONTINUED | OUTPATIENT
Start: 2018-12-05 | End: 2018-12-08

## 2018-12-05 RX ORDER — ALBUTEROL SULFATE 90 UG/1
1 AEROSOL, METERED RESPIRATORY (INHALATION) EVERY 6 HOURS PRN
COMMUNITY
End: 2019-10-01

## 2018-12-05 RX ORDER — DICYCLOMINE HYDROCHLORIDE 10 MG/1
10 CAPSULE ORAL
COMMUNITY

## 2018-12-05 RX ORDER — ZOLPIDEM TARTRATE 5 MG/1
5 TABLET ORAL NIGHTLY PRN
COMMUNITY
End: 2019-11-03 | Stop reason: ALTCHOICE

## 2018-12-05 RX ORDER — KETOROLAC TROMETHAMINE 30 MG/ML
30 INJECTION, SOLUTION INTRAMUSCULAR; INTRAVENOUS ONCE
Status: COMPLETED | OUTPATIENT
Start: 2018-12-05 | End: 2018-12-05

## 2018-12-05 RX ORDER — RIZATRIPTAN BENZOATE 10 MG/1
10 TABLET, ORALLY DISINTEGRATING ORAL AS NEEDED
Status: ON HOLD | COMMUNITY
End: 2018-12-20

## 2018-12-05 RX ORDER — GABAPENTIN 300 MG/1
300 CAPSULE ORAL 3 TIMES DAILY
Status: DISCONTINUED | OUTPATIENT
Start: 2018-12-05 | End: 2018-12-08

## 2018-12-05 RX ORDER — BUPROPION HYDROCHLORIDE 300 MG/1
300 TABLET ORAL DAILY
Status: ON HOLD | COMMUNITY
End: 2018-12-20

## 2018-12-05 RX ORDER — ALBUTEROL SULFATE 2.5 MG/3ML
2.5 SOLUTION RESPIRATORY (INHALATION) EVERY 4 HOURS PRN
Status: DISCONTINUED | OUTPATIENT
Start: 2018-12-05 | End: 2018-12-08

## 2018-12-05 RX ORDER — ALPRAZOLAM 0.25 MG/1
0.25 TABLET ORAL NIGHTLY PRN
COMMUNITY
End: 2019-03-31 | Stop reason: DRUGHIGH

## 2018-12-05 RX ORDER — KETOROLAC TROMETHAMINE 30 MG/ML
30 INJECTION, SOLUTION INTRAMUSCULAR; INTRAVENOUS EVERY 6 HOURS PRN
Status: DISPENSED | OUTPATIENT
Start: 2018-12-05 | End: 2018-12-07

## 2018-12-05 RX ORDER — ONDANSETRON 2 MG/ML
4 INJECTION INTRAMUSCULAR; INTRAVENOUS ONCE
Status: DISCONTINUED | OUTPATIENT
Start: 2018-12-05 | End: 2018-12-05

## 2018-12-05 RX ORDER — SODIUM CHLORIDE 9 MG/ML
INJECTION, SOLUTION INTRAVENOUS CONTINUOUS
Status: DISCONTINUED | OUTPATIENT
Start: 2018-12-05 | End: 2018-12-08

## 2018-12-05 RX ORDER — ONDANSETRON 4 MG/1
4 TABLET, FILM COATED ORAL EVERY 8 HOURS PRN
Status: DISCONTINUED | OUTPATIENT
Start: 2018-12-05 | End: 2018-12-08

## 2018-12-05 RX ORDER — GABAPENTIN 300 MG/1
600 CAPSULE ORAL 3 TIMES DAILY
COMMUNITY
End: 2022-01-19

## 2018-12-05 RX ORDER — PANTOPRAZOLE SODIUM 20 MG/1
20 TABLET, DELAYED RELEASE ORAL
Status: DISCONTINUED | OUTPATIENT
Start: 2018-12-06 | End: 2018-12-08

## 2018-12-05 RX ORDER — DIPHENHYDRAMINE HYDROCHLORIDE 50 MG/ML
25 INJECTION INTRAMUSCULAR; INTRAVENOUS ONCE
Status: COMPLETED | OUTPATIENT
Start: 2018-12-05 | End: 2018-12-05

## 2018-12-05 RX ORDER — ONDANSETRON 2 MG/ML
4 INJECTION INTRAMUSCULAR; INTRAVENOUS EVERY 4 HOURS PRN
Status: DISCONTINUED | OUTPATIENT
Start: 2018-12-05 | End: 2018-12-05

## 2018-12-05 RX ORDER — ESCITALOPRAM OXALATE 20 MG/1
20 TABLET ORAL DAILY
Status: DISCONTINUED | OUTPATIENT
Start: 2018-12-05 | End: 2018-12-08

## 2018-12-05 NOTE — ED NOTES
Patient refusing tylenol at this time. She states it hurts her stomach and she takes too much of it in other medications.

## 2018-12-05 NOTE — ED INITIAL ASSESSMENT (HPI)
Patient presents with migraine symptoms for the past 3 days. She states this one is different because she feels trembling in her hands. She states she is nauseated and has sensitivity to light and sound.

## 2018-12-05 NOTE — ED PROVIDER NOTES
Patient Seen in: [facility] Emergency Department    History   Patient presents with:  Headache (neurologic)    Stated Complaint: migraine    HPI  74-year-old female who presents here to the emergency department complaining of having exacerbation of Pulse 82   Resp 15   Temp 98.1 °F (36.7 °C)   Temp src Oral   SpO2 98 %   O2 Device None (Room air)       Current:/78 (BP Location: Left arm)   Pulse 80   Temp 98 °F (36.7 °C) (Oral)   Resp 16   Ht 170.2 cm (5' 7\")   Wt 68.9 kg   LMP 12/19/2013 view results for these tests on the individual orders.    SCAN SLIDE   RAINBOW DRAW BLUE   RAINBOW DRAW LAVENDER   RAINBOW DRAW LIGHT GREEN   RAINBOW DRAW GOLD                MDM   I explained to the patient with her chronic pain will likely only be able to

## 2018-12-05 NOTE — H&P
[facility] Hospitalist History and Physical      CC: HA    PCP: None Pcp    History of Present Illness: Patient is a 48year old female with PMH sig for migraines, anxiety/depression, fibromyalgia here with HA.  Has long history of migraines for which she follows mL (2.5 mg total) by nebulization every 4 (four) hours as needed for Wheezing or Shortness of Breath. Disp: 30 ampule Rfl: 0   triamcinolone acetonide 0.1 % External Ointment Apply 1 Application topically 2 (two) times daily.  Disp: 80 g Rfl: 0   dexamethas • Heart Disease Sister    • Cancer Neg    • Stroke Neg        Review of Systems: *Negative except as otherwise noted in HPI    Objective:  /73   Pulse 85   Temp 98.1 °F (36.7 °C) (Oral)   Resp 16   Ht 5' 7\" (1.702 m)   Wt 152 lb (68.9 kg)   LMP 12

## 2018-12-06 RX ORDER — ZOLPIDEM TARTRATE 10 MG/1
10 TABLET ORAL NIGHTLY PRN
Status: DISCONTINUED | OUTPATIENT
Start: 2018-12-06 | End: 2018-12-08

## 2018-12-06 RX ORDER — HYDROMORPHONE HYDROCHLORIDE 1 MG/ML
0.2 INJECTION, SOLUTION INTRAMUSCULAR; INTRAVENOUS; SUBCUTANEOUS EVERY 4 HOURS PRN
Status: DISCONTINUED | OUTPATIENT
Start: 2018-12-06 | End: 2018-12-08

## 2018-12-06 RX ORDER — NORTRIPTYLINE HYDROCHLORIDE 50 MG/1
100 CAPSULE ORAL NIGHTLY
Status: DISCONTINUED | OUTPATIENT
Start: 2018-12-06 | End: 2018-12-08

## 2018-12-06 RX ORDER — HYDROMORPHONE HYDROCHLORIDE 1 MG/ML
0.4 INJECTION, SOLUTION INTRAMUSCULAR; INTRAVENOUS; SUBCUTANEOUS EVERY 4 HOURS PRN
Status: DISCONTINUED | OUTPATIENT
Start: 2018-12-06 | End: 2018-12-08

## 2018-12-06 RX ORDER — MAGNESIUM SULFATE HEPTAHYDRATE 40 MG/ML
2 INJECTION, SOLUTION INTRAVENOUS ONCE
Status: COMPLETED | OUTPATIENT
Start: 2018-12-06 | End: 2018-12-06

## 2018-12-06 RX ORDER — METHYLPREDNISOLONE SODIUM SUCCINATE 125 MG/2ML
125 INJECTION, POWDER, LYOPHILIZED, FOR SOLUTION INTRAMUSCULAR; INTRAVENOUS EVERY 8 HOURS
Status: COMPLETED | OUTPATIENT
Start: 2018-12-06 | End: 2018-12-07

## 2018-12-06 RX ORDER — DEXAMETHASONE SODIUM PHOSPHATE 4 MG/ML
10 VIAL (ML) INJECTION ONCE
Status: COMPLETED | OUTPATIENT
Start: 2018-12-06 | End: 2018-12-06

## 2018-12-06 RX ORDER — DIHYDROERGOTAMINE MESYLATE 1 MG/ML
0.5 INJECTION, SOLUTION INTRAMUSCULAR; INTRAVENOUS; SUBCUTANEOUS EVERY 8 HOURS
Status: COMPLETED | OUTPATIENT
Start: 2018-12-06 | End: 2018-12-07

## 2018-12-06 NOTE — PROGRESS NOTES
[facility] Hospitalist Progress Note                                                                   Bro[place] 46  8/25/1965    CC: FU migraine    Interval History:  - Feels a little sam 29.3   MCHC  32.7   RDW  12.7   NEPRELIM  8.52*   WBC  9.4   PLT  308.0     Recent Labs   Lab  12/05/18   1857   GLU  134*   BUN  10   CREATSERUM  0.80   GFRAA  99   GFRNAA  86   CA  8.8   NA  138   K  4.4   CL  108   CO2  25.0           ROS: no change to

## 2018-12-06 NOTE — PROGRESS NOTES
NURSING ADMISSION NOTE      Patient admitted via Cart  Oriented to room. Safety precautions initiated. Bed in low position. Call light in reach. Pt arrived to the unit AOx4. VSS, on RA. C/o migraine. Admission navigator completed.  All questions a

## 2018-12-06 NOTE — PLAN OF CARE
PAIN - ADULT    • Verbalizes/displays adequate comfort level or patient's stated pain goal Progressing        SAFETY ADULT - FALL    • Free from fall injury Progressing        Resumed care at 1930  Pt A&Ox4 anxious at times, cooperative  VS WNL, RA  Genera

## 2018-12-06 NOTE — PLAN OF CARE
PAIN - ADULT    • Verbalizes/displays adequate comfort level or patient's stated pain goal Not Progressing        SAFETY ADULT - FALL    • Free from fall injury Not Progressing          Pt A&OX4  Up ad flaco  regular diet  C/o HA 7-10 pain- prn toradol Q6; p

## 2018-12-07 RX ORDER — DIHYDROERGOTAMINE MESYLATE 1 MG/ML
0.5 INJECTION, SOLUTION INTRAMUSCULAR; INTRAVENOUS; SUBCUTANEOUS ONCE
Status: COMPLETED | OUTPATIENT
Start: 2018-12-07 | End: 2018-12-07

## 2018-12-07 NOTE — PLAN OF CARE
PAIN - ADULT    • Verbalizes/displays adequate comfort level or patient's stated pain goal Progressing        SAFETY ADULT - FALL    • Free from fall injury Progressing        Assumed patient care at [address]. VSS. A&Ox4. Room air. No tele.    C/o headache pain

## 2018-12-07 NOTE — PROGRESS NOTES
12/07/18 1322   Clinical Encounter Type   Visited With Patient   Pentecostalism Encounters   Pentecostalism Needs Prayer   Spiritual Requests During Visit / Hospitalization [facility] S[facility]-Anointing Anointed   Th

## 2018-12-07 NOTE — PROGRESS NOTES
Neurology follow up NOTE    SUBJECTIVE:  She has received DHE, IV solumedrol, Depakote and magnesium overnight. She took Ambien 10mg hs and slept ok. Her headaches are better for the most part, sometimes she has stronger headaches, lasted short time.  She t deferred   SPEECH:     Articulation: NL     Rhythm: NL     REFLEXES:     RUE: 2+/4     RLE: 2+/4     LUE: 2+/4     LLE: 2+/4     PLANTAR RESPONSE: down going toes bilaterally.         LAB:    BMP:   No results found for: GLUCOSE        Lab Results   Compon

## 2018-12-07 NOTE — PAYOR COMM NOTE
--------------  ADMISSION REVIEW     Payor: Fernando Rios #:  [id number]  Authorization Number: [id number]    Clarification:  Patient to ED on 12/5/18 as [facility]    Admit date: 12/7/18  Admit

## 2018-12-07 NOTE — PROGRESS NOTES
Neurology consult NOTE    The reason for consultation:    Intractable migraine. HPI:   Patient is a 48year old female with PMH sig for migraines, anxiety/depression, fibromyalgia. She was admitted for intractable migraine in past three days.  She was see file      Highest education level: Not on file    Social Needs      Financial resource strain: Not on file      Food insecurity - worry: Not on file      Food insecurity - inability: Not on file      Transportation needs - medical: Not on file      Transpo nightly as needed for Sleep. Disp:  Rfl:    Zolpidem Tartrate 5 MG Oral Tab Take 5 mg by mouth nightly as needed for Sleep. Disp:  Rfl:    Dicyclomine HCl 10 MG Oral Cap Take 10 mg by mouth 4 (four) times daily before meals and nightly.  Disp:  Rfl:    Valentino Muss denies nausea, vomiting, constipation, diarrhea; no rectal bleeding; no heartburn. MUSCULOSKELETAL: pain all over. PSYCHE: depression and anxiety  NEURO: headaches. left arm tingling.         Physical Examination:   CONSTITUTIONAL/CV     Appearance: she l .0 12/05/2018    .0 11/19/2018    .0 05/25/2017         IMAGE:   Not in this admission. ASSESSMENT:     Lashawn Martin presented long hx of chronic migraine, stress, depression. She is admitted for intractable migraine.  Of note, she w

## 2018-12-07 NOTE — CM/SW NOTE
MSW met w/patient to discuss order. Pt is from home alone, she has a boyfriend who has his own. Pt has a dtr who is supportive and does not live at home. Pt states with her medicaid insurance her botox injections for her migranes are not covered.  She is

## 2018-12-07 NOTE — PROGRESS NOTES
[facility] Hospitalist note    PCP: None Pcp    Chief Complaint:  F/u migraines    SUBJECTIVE:  Reports she is starting to feel better partially  Would like to go home.       OBJECTIVE:  Temp:  [97.8 °F (36.6 °C)-98.1 °F (36.7 °C)] 98.1 °F (36.7 °C)  Pulse:  [7 albuterol sulfate, Dicyclomine HCl, DiphenhydrAMINE HCl, Ondansetron HCl, ketorolac (TORADOL) injection, ondansetron HCl, Prochlorperazine Edisylate       Assessment/Plan:     1) intractable migraine  - per neuro recs : DHE alternating with IV solumedrol,

## 2018-12-07 NOTE — PLAN OF CARE
PAIN - ADULT    • Verbalizes/displays adequate comfort level or patient's stated pain goal Progressing        SAFETY ADULT - FALL    • Free from fall injury Progressing            Pt is a/o x4. Drowsy. Room air. VSS  Continues to have migraine.  Pain slight

## 2018-12-08 ENCOUNTER — APPOINTMENT (OUTPATIENT)
Dept: GENERAL RADIOLOGY | Facility: HOSPITAL | Age: 53
DRG: 103 | End: 2018-12-08
Attending: HOSPITALIST
Payer: MEDICAID

## 2018-12-08 VITALS
HEIGHT: 67 IN | RESPIRATION RATE: 18 BRPM | SYSTOLIC BLOOD PRESSURE: 108 MMHG | HEART RATE: 101 BPM | TEMPERATURE: 98 F | OXYGEN SATURATION: 98 % | DIASTOLIC BLOOD PRESSURE: 66 MMHG | BODY MASS INDEX: 23.86 KG/M2 | WEIGHT: 152 LBS

## 2018-12-08 PROCEDURE — 73630 X-RAY EXAM OF FOOT: CPT | Performed by: HOSPITALIST

## 2018-12-08 RX ORDER — NORTRIPTYLINE HYDROCHLORIDE 50 MG/1
100 CAPSULE ORAL NIGHTLY
Qty: 30 CAPSULE | Refills: 0 | Status: SHIPPED | OUTPATIENT
Start: 2018-12-08 | End: 2019-11-03 | Stop reason: ALTCHOICE
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
## 2018-12-08 NOTE — PLAN OF CARE
PAIN - ADULT    • Verbalizes/displays adequate comfort level or patient's stated pain goal Progressing        SAFETY ADULT - FALL    • Free from fall injury Progressing            Pt is a/o x4. Room air.  VSS   PRN dilaudid for pain   PRN zofran and jaydon

## 2018-12-08 NOTE — PROGRESS NOTES
NURSING DISCHARGE NOTE    Discharged Home via Wheelchair. Accompanied by RN  Belongings Taken by patient/family.     Discharge paperwork reviewed with patient   All questions answered  IV removed   All belongings taken with patient   Pt left unit in st

## 2018-12-08 NOTE — PLAN OF CARE
PAIN - ADULT    • Verbalizes/displays adequate comfort level or patient's stated pain goal Progressing        SAFETY ADULT - FALL    • Free from fall injury Progressing        Assumed patient care at 1. VSS. Room air. No tele.   Pain located in the head,

## 2018-12-13 ENCOUNTER — HOSPITAL ENCOUNTER (OUTPATIENT)
Age: 53
Discharge: HOME | End: 2018-12-13
Attending: FAMILY MEDICINE
Payer: MEDICAID

## 2018-12-13 ENCOUNTER — APPOINTMENT (OUTPATIENT)
Dept: GENERAL RADIOLOGY | Age: 53
End: 2018-12-13
Attending: FAMILY MEDICINE
Payer: MEDICAID

## 2018-12-13 VITALS
HEART RATE: 90 BPM | TEMPERATURE: 98 F | HEIGHT: 67.5 IN | DIASTOLIC BLOOD PRESSURE: 98 MMHG | WEIGHT: 155 LBS | OXYGEN SATURATION: 99 % | BODY MASS INDEX: 24.05 KG/M2 | SYSTOLIC BLOOD PRESSURE: 158 MMHG | RESPIRATION RATE: 18 BRPM

## 2018-12-13 DIAGNOSIS — J32.4 CHRONIC PANSINUSITIS: Primary | ICD-10-CM

## 2018-12-13 DIAGNOSIS — R23.4 SKIN FISSURES: ICD-10-CM

## 2018-12-13 PROCEDURE — 99214 OFFICE O/P EST MOD 30 MIN: CPT

## 2018-12-13 PROCEDURE — 99213 OFFICE O/P EST LOW 20 MIN: CPT

## 2018-12-13 PROCEDURE — 71046 X-RAY EXAM CHEST 2 VIEWS: CPT | Performed by: FAMILY MEDICINE

## 2018-12-13 RX ORDER — AMMONIUM LACTATE 12 G/100G
1 LOTION TOPICAL AS NEEDED
Qty: 1 BOTTLE | Refills: 0 | Status: SHIPPED | OUTPATIENT
Start: 2018-12-13 | End: 2019-11-03 | Stop reason: ALTCHOICE

## 2018-12-13 RX ORDER — BENZONATATE 100 MG/1
100 CAPSULE ORAL 3 TIMES DAILY PRN
Qty: 30 CAPSULE | Refills: 0 | Status: SHIPPED | OUTPATIENT
Start: 2018-12-13 | End: 2019-01-12

## 2018-12-13 RX ORDER — PREDNISONE 20 MG/1
20 TABLET ORAL DAILY
Qty: 3 TABLET | Refills: 0 | Status: SHIPPED | OUTPATIENT
Start: 2018-12-13 | End: 2018-12-16

## 2018-12-13 RX ORDER — SULFAMETHOXAZOLE AND TRIMETHOPRIM 800; 160 MG/1; MG/1
1 TABLET ORAL 2 TIMES DAILY
Qty: 20 TABLET | Refills: 0 | Status: SHIPPED | OUTPATIENT
Start: 2018-12-13 | End: 2018-12-23

## 2018-12-13 NOTE — ED INITIAL ASSESSMENT (HPI)
Patient reports that she can hardly swallow, she is coughing, she is feeling dizzy and weak, and it's hard for her to walk.  Patient reports she has been having problems since October and has come in to get seen and has been given antibiotics but patient re

## 2018-12-13 NOTE — ED NOTES
Patient requesting pain medication in addition to Norco that she is already on. made aware and in to discuss plan of care with patient.

## 2018-12-13 NOTE — ED PROVIDER NOTES
Patient Seen in: [facility]    History   Patient presents with:  Cough/URI    Stated Complaint: COUGH/FOOT PAIN     HPI    61-year-old female with a history of anxiety, fibromyalgia, IBS, and migraine headache returns to the IC presenting problem.     Social History    Tobacco Use      Smoking status: Never Smoker      Smokeless tobacco: Never Used    Alcohol use: No    Drug use: No      Review of Systems    Positive for stated complaint: COUGH/FOOT PAIN   Other systems are as not on Bactrim for her sinus infection. This will also cover her skin fissures in her feet. Tessalon Perles given for the sore throat and cough. Lac-Hydrin for hydration of her dry feet.     On attempt to discharge patient, but she became tearful secondary t

## 2018-12-18 ENCOUNTER — HOSPITAL ENCOUNTER (OUTPATIENT)
Facility: HOSPITAL | Age: 53
Setting detail: OBSERVATION
Discharge: HOME | End: 2018-12-20
Attending: EMERGENCY MEDICINE | Admitting: HOSPITALIST
Payer: MEDICAID

## 2018-12-18 ENCOUNTER — HOSPITAL ENCOUNTER (OUTPATIENT)
Age: 53
Discharge: ANOTHER HOSPITAL | End: 2018-12-18
Attending: FAMILY MEDICINE
Payer: MEDICAID

## 2018-12-18 VITALS
RESPIRATION RATE: 18 BRPM | TEMPERATURE: 98 F | OXYGEN SATURATION: 100 % | SYSTOLIC BLOOD PRESSURE: 155 MMHG | HEART RATE: 76 BPM | DIASTOLIC BLOOD PRESSURE: 105 MMHG

## 2018-12-18 DIAGNOSIS — M70.60 TROCHANTERIC BURSITIS: ICD-10-CM

## 2018-12-18 DIAGNOSIS — M54.2 NECK PAIN: ICD-10-CM

## 2018-12-18 DIAGNOSIS — G43.809 OTHER MIGRAINE WITHOUT STATUS MIGRAINOSUS, NOT INTRACTABLE: Primary | ICD-10-CM

## 2018-12-18 DIAGNOSIS — F32.5 MAJOR DEPRESSIVE DISORDER IN FULL REMISSION, UNSPECIFIED WHETHER RECURRENT (HCC): ICD-10-CM

## 2018-12-18 DIAGNOSIS — R10.9 ABDOMINAL PAIN OF UNKNOWN ETIOLOGY: ICD-10-CM

## 2018-12-18 DIAGNOSIS — G43.711 INTRACTABLE CHRONIC MIGRAINE WITHOUT AURA AND WITH STATUS MIGRAINOSUS: Primary | ICD-10-CM

## 2018-12-18 DIAGNOSIS — M48.02 CERVICAL SPINAL STENOSIS: ICD-10-CM

## 2018-12-18 PROCEDURE — 96374 THER/PROPH/DIAG INJ IV PUSH: CPT

## 2018-12-18 PROCEDURE — 85025 COMPLETE CBC W/AUTO DIFF WBC: CPT | Performed by: EMERGENCY MEDICINE

## 2018-12-18 PROCEDURE — 85025 COMPLETE CBC W/AUTO DIFF WBC: CPT | Performed by: FAMILY MEDICINE

## 2018-12-18 PROCEDURE — 99285 EMERGENCY DEPT VISIT HI MDM: CPT

## 2018-12-18 PROCEDURE — 82607 VITAMIN B-12: CPT | Performed by: OTHER

## 2018-12-18 PROCEDURE — 99215 OFFICE O/P EST HI 40 MIN: CPT

## 2018-12-18 PROCEDURE — 96375 TX/PRO/DX INJ NEW DRUG ADDON: CPT

## 2018-12-18 PROCEDURE — 82306 VITAMIN D 25 HYDROXY: CPT | Performed by: OTHER

## 2018-12-18 PROCEDURE — 86038 ANTINUCLEAR ANTIBODIES: CPT | Performed by: OTHER

## 2018-12-18 PROCEDURE — 83921 ORGANIC ACID SINGLE QUANT: CPT | Performed by: OTHER

## 2018-12-18 PROCEDURE — 81002 URINALYSIS NONAUTO W/O SCOPE: CPT | Performed by: FAMILY MEDICINE

## 2018-12-18 PROCEDURE — 96372 THER/PROPH/DIAG INJ SC/IM: CPT

## 2018-12-18 PROCEDURE — 96367 TX/PROPH/DG ADDL SEQ IV INF: CPT

## 2018-12-18 PROCEDURE — 80048 BASIC METABOLIC PNL TOTAL CA: CPT | Performed by: EMERGENCY MEDICINE

## 2018-12-18 PROCEDURE — 96365 THER/PROPH/DIAG IV INF INIT: CPT

## 2018-12-18 PROCEDURE — 86618 LYME DISEASE ANTIBODY: CPT | Performed by: OTHER

## 2018-12-18 PROCEDURE — 80047 BASIC METABLC PNL IONIZED CA: CPT

## 2018-12-18 RX ORDER — MAGNESIUM SULFATE HEPTAHYDRATE 40 MG/ML
2 INJECTION, SOLUTION INTRAVENOUS ONCE
Status: COMPLETED | OUTPATIENT
Start: 2018-12-18 | End: 2018-12-18

## 2018-12-18 RX ORDER — TOPIRAMATE 25 MG/1
75 TABLET ORAL 2 TIMES DAILY
Status: DISCONTINUED | OUTPATIENT
Start: 2018-12-18 | End: 2018-12-20

## 2018-12-18 RX ORDER — ONDANSETRON 2 MG/ML
INJECTION INTRAMUSCULAR; INTRAVENOUS
Status: COMPLETED
Start: 2018-12-18 | End: 2018-12-18

## 2018-12-18 RX ORDER — BUPROPION HYDROCHLORIDE 150 MG/1
150 TABLET, EXTENDED RELEASE ORAL
Status: DISCONTINUED | OUTPATIENT
Start: 2018-12-19 | End: 2018-12-20

## 2018-12-18 RX ORDER — FOLIC ACID/VIT B COMPLEX AND C 400 MCG
1 TABLET ORAL DAILY
Status: DISCONTINUED | OUTPATIENT
Start: 2018-12-19 | End: 2018-12-20

## 2018-12-18 RX ORDER — PANTOPRAZOLE SODIUM 20 MG/1
20 TABLET, DELAYED RELEASE ORAL
Status: DISCONTINUED | OUTPATIENT
Start: 2018-12-19 | End: 2018-12-20

## 2018-12-18 RX ORDER — ALPRAZOLAM 0.25 MG/1
0.25 TABLET ORAL NIGHTLY PRN
Status: DISCONTINUED | OUTPATIENT
Start: 2018-12-18 | End: 2018-12-20

## 2018-12-18 RX ORDER — ZOLPIDEM TARTRATE 5 MG/1
5 TABLET ORAL NIGHTLY PRN
Status: DISCONTINUED | OUTPATIENT
Start: 2018-12-18 | End: 2018-12-20

## 2018-12-18 RX ORDER — DEXAMETHASONE SODIUM PHOSPHATE 4 MG/ML
10 VIAL (ML) INJECTION ONCE
Status: COMPLETED | OUTPATIENT
Start: 2018-12-18 | End: 2018-12-18

## 2018-12-18 RX ORDER — KETOROLAC TROMETHAMINE 30 MG/ML
30 INJECTION, SOLUTION INTRAMUSCULAR; INTRAVENOUS ONCE
Status: DISCONTINUED | OUTPATIENT
Start: 2018-12-18 | End: 2018-12-18

## 2018-12-18 RX ORDER — BUTALBITAL, ACETAMINOPHEN AND CAFFEINE 50; 325; 40 MG/1; MG/1; MG/1
1 TABLET ORAL EVERY 4 HOURS PRN
COMMUNITY
End: 2019-07-21

## 2018-12-18 RX ORDER — ONDANSETRON 2 MG/ML
4 INJECTION INTRAMUSCULAR; INTRAVENOUS EVERY 4 HOURS PRN
Status: DISCONTINUED | OUTPATIENT
Start: 2018-12-18 | End: 2018-12-20

## 2018-12-18 RX ORDER — DIPHENHYDRAMINE HYDROCHLORIDE 50 MG/ML
25 INJECTION INTRAMUSCULAR; INTRAVENOUS ONCE
Status: COMPLETED | OUTPATIENT
Start: 2018-12-18 | End: 2018-12-18

## 2018-12-18 RX ORDER — MAGNESIUM HYDROXIDE/ALUMINUM HYDROXICE/SIMETHICONE 120; 1200; 1200 MG/30ML; MG/30ML; MG/30ML
30 SUSPENSION ORAL ONCE
Status: COMPLETED | OUTPATIENT
Start: 2018-12-18 | End: 2018-12-18

## 2018-12-18 RX ORDER — SUMATRIPTAN 6 MG/.5ML
6 INJECTION, SOLUTION SUBCUTANEOUS ONCE
Status: COMPLETED | OUTPATIENT
Start: 2018-12-18 | End: 2018-12-18

## 2018-12-18 RX ORDER — ESCITALOPRAM OXALATE 20 MG/1
20 TABLET ORAL EVERY EVENING
Status: DISCONTINUED | OUTPATIENT
Start: 2018-12-18 | End: 2018-12-20

## 2018-12-18 RX ORDER — ALBUTEROL SULFATE 90 UG/1
1 AEROSOL, METERED RESPIRATORY (INHALATION) EVERY 6 HOURS PRN
Status: DISCONTINUED | OUTPATIENT
Start: 2018-12-18 | End: 2018-12-20

## 2018-12-18 RX ORDER — SODIUM CHLORIDE 9 MG/ML
INJECTION, SOLUTION INTRAVENOUS CONTINUOUS
Status: DISCONTINUED | OUTPATIENT
Start: 2018-12-18 | End: 2018-12-20

## 2018-12-18 RX ORDER — RIZATRIPTAN BENZOATE 10 MG/1
10 TABLET, ORALLY DISINTEGRATING ORAL AS NEEDED
Status: DISCONTINUED | OUTPATIENT
Start: 2018-12-18 | End: 2018-12-19

## 2018-12-18 RX ORDER — GABAPENTIN 300 MG/1
600 CAPSULE ORAL 3 TIMES DAILY
Status: DISCONTINUED | OUTPATIENT
Start: 2018-12-18 | End: 2018-12-20

## 2018-12-18 RX ORDER — ONDANSETRON 4 MG/1
4 TABLET, FILM COATED ORAL EVERY 8 HOURS PRN
Status: DISCONTINUED | OUTPATIENT
Start: 2018-12-18 | End: 2018-12-20

## 2018-12-18 RX ORDER — DICYCLOMINE HYDROCHLORIDE 10 MG/1
10 CAPSULE ORAL 4 TIMES DAILY PRN
Status: DISCONTINUED | OUTPATIENT
Start: 2018-12-19 | End: 2018-12-20

## 2018-12-18 RX ORDER — BUTALBITAL, ACETAMINOPHEN AND CAFFEINE 50; 325; 40 MG/1; MG/1; MG/1
1 TABLET ORAL EVERY 4 HOURS PRN
Status: DISCONTINUED | OUTPATIENT
Start: 2018-12-18 | End: 2018-12-19

## 2018-12-18 RX ORDER — DEXAMETHASONE 2 MG/1
2 TABLET ORAL 2 TIMES DAILY PRN
Status: DISCONTINUED | OUTPATIENT
Start: 2018-12-19 | End: 2018-12-20

## 2018-12-18 RX ORDER — NORTRIPTYLINE HYDROCHLORIDE 50 MG/1
100 CAPSULE ORAL NIGHTLY
Status: DISCONTINUED | OUTPATIENT
Start: 2018-12-18 | End: 2018-12-20

## 2018-12-18 RX ORDER — LORAZEPAM 1 MG/1
1 TABLET ORAL ONCE
Status: COMPLETED | OUTPATIENT
Start: 2018-12-18 | End: 2018-12-19

## 2018-12-18 RX ORDER — SULFAMETHOXAZOLE AND TRIMETHOPRIM 800; 160 MG/1; MG/1
1 TABLET ORAL 2 TIMES DAILY
Status: DISCONTINUED | OUTPATIENT
Start: 2018-12-18 | End: 2018-12-20

## 2018-12-18 RX ORDER — ONDANSETRON 2 MG/ML
4 INJECTION INTRAMUSCULAR; INTRAVENOUS
Status: DISCONTINUED | OUTPATIENT
Start: 2018-12-18 | End: 2018-12-20

## 2018-12-18 RX ORDER — ONDANSETRON 2 MG/ML
4 INJECTION INTRAMUSCULAR; INTRAVENOUS ONCE
Status: COMPLETED | OUTPATIENT
Start: 2018-12-18 | End: 2018-12-18

## 2018-12-18 RX ORDER — DIPHENHYDRAMINE HCL 25 MG
25 CAPSULE ORAL EVERY 6 HOURS PRN
Status: DISCONTINUED | OUTPATIENT
Start: 2018-12-18 | End: 2018-12-20

## 2018-12-18 RX ORDER — KETOROLAC TROMETHAMINE 30 MG/ML
30 INJECTION, SOLUTION INTRAMUSCULAR; INTRAVENOUS ONCE
Status: COMPLETED | OUTPATIENT
Start: 2018-12-18 | End: 2018-12-18

## 2018-12-18 NOTE — ED PROVIDER NOTES
Patient Seen in: [facility]    History   Patient presents with:  Cough    Stated Complaint: cough migraine    HPI    27-year-old female history of anxiety, depression, fibromyalgia, migraine and GERD presents for headache, chron 12/18/18 1536 18   Temp 12/18/18 1536 98.2 °F (36.8 °C)   Temp src 12/18/18 1536 Oral   SpO2 12/18/18 1536 98 %   O2 Device 12/18/18 1535 None (Room air)       Current:BP (!) 155/105   Pulse 76   Temp 98.2 °F (36.8 °C) (Oral)   Resp 18   LMP 12/19/2013   S her headache got worse. She was send to ER via ambulance for further management.              Disposition and Plan     Clinical Impression:  Other migraine without status migrainosus, not intractable  (primary encounter diagnosis)  Abdominal pain of unknown

## 2018-12-18 NOTE — ED INITIAL ASSESSMENT (HPI)
C/O cough that she has been seen for several times. Given antibiotics and steroids w/o any relief of cough. Sts that today her cough is worse and she feels SOB with exertion.  Also c/o migraine that started yesterday and has taken her home meds w/o any reli

## 2018-12-18 NOTE — DISCHARGE SUMMARY
[facility] Discharge Summary    Patient ID:  Chantel Gifford  [id number]  48year old  8/25/1965    Admit date: 12/5/2018  Discharge date and time: 12/8/18  Attending Physician: Rea Phillip MD  Primary Care Physician: None Pcp     Admit D c/c/e  Neuro: CN Intact, no focal deficits    Discharge meds     Medication List      CHANGE how you take these medications    Nortriptyline HCl 50 MG Caps  Commonly known as:  PAMELOR  Take 2 capsules (100 mg total) by mouth nightly.   What changed:    · m CONSULT TO SOCIAL WORK  Radiology: Xr Chest Pa + Lat Chest (cpt=71046)    Result Date: 12/13/2018  PROCEDURE:  XR CHEST PA + LAT CHEST (CPT=71046)  INDICATIONS:  COUGH/FOOT PAIN  COMPARISON:  ASHLY, CHEST PA   LATERAL, 3/30/2011, 9:15.  TECHNIQUE:  P

## 2018-12-19 ENCOUNTER — APPOINTMENT (OUTPATIENT)
Dept: MRI IMAGING | Facility: HOSPITAL | Age: 53
End: 2018-12-19
Attending: Other
Payer: MEDICAID

## 2018-12-19 PROCEDURE — 80335 ANTIDEPRESSANT TRICYCLIC 1/2: CPT | Performed by: OTHER

## 2018-12-19 PROCEDURE — 82746 ASSAY OF FOLIC ACID SERUM: CPT | Performed by: OTHER

## 2018-12-19 PROCEDURE — 86235 NUCLEAR ANTIGEN ANTIBODY: CPT | Performed by: OTHER

## 2018-12-19 PROCEDURE — 83921 ORGANIC ACID SINGLE QUANT: CPT | Performed by: OTHER

## 2018-12-19 PROCEDURE — 97161 PT EVAL LOW COMPLEX 20 MIN: CPT

## 2018-12-19 PROCEDURE — 86038 ANTINUCLEAR ANTIBODIES: CPT | Performed by: OTHER

## 2018-12-19 PROCEDURE — 86225 DNA ANTIBODY NATIVE: CPT | Performed by: OTHER

## 2018-12-19 PROCEDURE — 86618 LYME DISEASE ANTIBODY: CPT | Performed by: OTHER

## 2018-12-19 PROCEDURE — 82607 VITAMIN B-12: CPT | Performed by: OTHER

## 2018-12-19 PROCEDURE — 70546 MR ANGIOGRAPH HEAD W/O&W/DYE: CPT | Performed by: OTHER

## 2018-12-19 PROCEDURE — 84443 ASSAY THYROID STIM HORMONE: CPT | Performed by: OTHER

## 2018-12-19 PROCEDURE — A9575 INJ GADOTERATE MEGLUMI 0.1ML: HCPCS

## 2018-12-19 PROCEDURE — 83735 ASSAY OF MAGNESIUM: CPT | Performed by: OTHER

## 2018-12-19 PROCEDURE — 97530 THERAPEUTIC ACTIVITIES: CPT

## 2018-12-19 PROCEDURE — 80171 DRUG SCREEN QUANT GABAPENTIN: CPT | Performed by: OTHER

## 2018-12-19 PROCEDURE — 84425 ASSAY OF VITAMIN B-1: CPT | Performed by: OTHER

## 2018-12-19 PROCEDURE — 85652 RBC SED RATE AUTOMATED: CPT | Performed by: OTHER

## 2018-12-19 PROCEDURE — 82306 VITAMIN D 25 HYDROXY: CPT | Performed by: OTHER

## 2018-12-19 PROCEDURE — 80164 ASSAY DIPROPYLACETIC ACD TOT: CPT | Performed by: OTHER

## 2018-12-19 PROCEDURE — 80201 ASSAY OF TOPIRAMATE: CPT | Performed by: OTHER

## 2018-12-19 RX ORDER — KETOROLAC TROMETHAMINE 30 MG/ML
30 INJECTION, SOLUTION INTRAMUSCULAR; INTRAVENOUS EVERY 6 HOURS PRN
Status: DISCONTINUED | OUTPATIENT
Start: 2018-12-19 | End: 2018-12-19

## 2018-12-19 RX ORDER — HYDROCODONE BITARTRATE AND ACETAMINOPHEN 10; 325 MG/1; MG/1
1 TABLET ORAL EVERY 6 HOURS PRN
Status: DISCONTINUED | OUTPATIENT
Start: 2018-12-19 | End: 2018-12-20

## 2018-12-19 NOTE — PHYSICAL THERAPY NOTE
PHYSICAL THERAPY QUICK EVALUATION - INPATIENT    Room Number: 2492/0847-F  Evaluation Date: 12/19/2018  Presenting Problem: HA, migraine  Physician Order: PT Eval and Treat    Problem List  Principal Problem:    Intractable chronic migraine without aura NEUROLOGICAL FINDINGS                      ACTIVITY TOLERANCE                         O2 WALK                  AM-PAC '6-Clicks' INPATIENT SHORT FORM - BASIC MOBILITY  How much difficulty does the patient currently have. ..  -   Turning over in bed (inc migraine. Pertinent comorbidities and personal factors impacting therapy include history of migraines and cervicalgia. Functional outcome measures completed include AMPAC.  Based on this evaluation, patient's clinical presentation is stable and overall eval

## 2018-12-19 NOTE — H&P
[facility] Hospitalist History and Physical      Patient presents with:  Headache (neurologic)       PCP: None Pcp      History of Present Illness: Patient is a 48year old female with PMH sig for anxiety/depression, IBS, fibromyalgia, severe migraine presents fo OBJECTIVE:  /79 (BP Location: Left arm)   Pulse 87   Temp 97.5 °F (36.4 °C) (Oral)   Resp 18   Ht 5' 8\" (1.727 m)   Wt 157 lb 4.8 oz (71.4 kg)   LMP 12/19/2013   SpO2 94%   BMI 23.92 kg/m²   General:  Alert, no distress, appears stated age.     H complains of difficulty swallowing and a productive/dry cough since October. Nonsmoker. FINDINGS:  LUNGS:  No focal consolidation. Normal vascularity. CARDIAC:  Normal size cardiac silhouette.  MEDIASTINUM:  Normal. PLEURA:  Normal.  No pleural effusion

## 2018-12-19 NOTE — BH LEVEL OF CARE ASSESSMENT
Level of Care Assessment Note    General Questions  Why are you here?: Patient came into the hospital due to shortness of breath and a cough  Precipitating Events: Pt has a history of anxiety and depression  History of Present Illness:  The pt states she ha Physical Contact: No  Have you ever damaged/destroyed property or thought about it?: No    Access to Means  Has access to means to attempt suicide or harm others or property: No  Access to Firearm/Weapon: No  Do you have a firearm owner ID card?: No    Lyndsey since age 15 y/o  Date Last Seen: 2017  Reason: medication managment  Effectiveness : sometimes helpful  Current/Previous MH/CD Treatment  Recovery Support Groups: Denies Past History  History of Seclusion/Restraint: No    Alcohol Use  How often do you hav Indirect  Psychomotor Behavior  Gait/Movement: (pt in bed)  Abnormal movements: None  Posture: Stiff  Rate of Movement: Normal  Mood and Affect  Mood or Feelings: Sadness;Depressed; Anxious;Stressed  Anxiety Level- LONNIE only: Mild  Appropriateness of Affect: Therapy; Medication management  Referral 1: KIMANI Mishra

## 2018-12-19 NOTE — CONSULTS
[facility]  Neurology  [facility] Consultation Report    Margeret Claude Patient Status:  Observation    1965 MRN [id number]   [facility] 3NE-A Attending Martha Collado MD   Hosp Day # 0 PCP None Pcp   Date of Admiss PAIN MANAGEMENT   • COCCYX N/A 1/19/2015    Performed by Ivan Davila MD at [address]     • OTHER      bladder sling   • OTHER      jaw surgery for TMJ   • OTHER SURGICAL HISTORY     • REMOVAL GALLBLADDER     • TOTA Continuous   Valproate Sodium (DEPACON) 500 mg in sodium chloride 0.9 % 100 mL IVPB 500 mg Intravenous Q8H   ondansetron HCl (ZOFRAN) injection 4 mg 4 mg Intravenous Q4H PRN       Medications Prior to Admission:  Butalbital-APAP-Caffeine -40 MG Oral 3 po bid   dexamethasone 2 MG Oral Tab Take 1 tablet (2 mg total) by mouth 2 (two) times daily with meals.  Decadron 2 mg along with Zofran 8 mg and Benadryl 25 mg q 6 hr for up to 72 hrs   DiphenhydrAMINE HCl 25 MG Oral Tab Take 1 tablet (25 mg total) by m Neck: supple, normal range of motion, no cervical bruit  Lymph Nodes:  No adenopathy  Cardiovascular: S1, S2, no murmur. Regular rate and rhythm.   Lungs: Clear to auscultation  Skin: No evidence for rash    NEUROLOGICAL EXAMINATION  Mental status exam: radiographs or MRI. Dictated by: Annabella Sun MD on 12/08/2018 at 12:35     Approved by: Annabella Sun MD            Pertinent Labs and Imaging: Reviewed.     Impression:     Intractable chronic migraine without aura and with status migrainosus  The patient c

## 2018-12-19 NOTE — ED PROVIDER NOTES
Patient Seen in: [facility] Emergency Department    History   Patient presents with:  Headache (neurologic)    Stated Complaint: headache    HPI    Patient was admitted to the hospital earlier this month with intractable chronic migraine with status complaint: headache  Other systems are as noted in HPI. Constitutional and vital signs reviewed. All other systems reviewed and negative except as noted above.     Physical Exam     ED Triage Vitals   BP 12/18/18 1912 (!) 161/106   Pulse 12/18/18 1912 were created for panel order CBC WITH DIFFERENTIAL WITH PLATELET.   Procedure                               Abnormality         Status                     ---------                               -----------         ------                     CBC W/ DIFFERRAFFI

## 2018-12-20 VITALS
TEMPERATURE: 98 F | RESPIRATION RATE: 18 BRPM | SYSTOLIC BLOOD PRESSURE: 112 MMHG | DIASTOLIC BLOOD PRESSURE: 83 MMHG | HEIGHT: 68 IN | WEIGHT: 157.31 LBS | BODY MASS INDEX: 23.84 KG/M2 | HEART RATE: 82 BPM | OXYGEN SATURATION: 98 %

## 2018-12-20 PROCEDURE — 96376 TX/PRO/DX INJ SAME DRUG ADON: CPT

## 2018-12-20 PROCEDURE — 96375 TX/PRO/DX INJ NEW DRUG ADDON: CPT

## 2018-12-20 RX ORDER — ONDANSETRON HYDROCHLORIDE 8 MG/1
8 TABLET, FILM COATED ORAL EVERY 8 HOURS PRN
Qty: 30 TABLET | Refills: 0 | Status: SHIPPED | OUTPATIENT
Start: 2018-12-20 | End: 2019-04-01

## 2018-12-20 RX ORDER — ESCITALOPRAM OXALATE 20 MG/1
20 TABLET ORAL EVERY EVENING
Qty: 30 TABLET | Refills: 0 | Status: SHIPPED | OUTPATIENT
Start: 2018-12-20 | End: 2019-07-21

## 2018-12-20 RX ORDER — BUPROPION HYDROCHLORIDE 300 MG/1
300 TABLET ORAL DAILY
Qty: 30 TABLET | Refills: 0 | Status: SHIPPED | OUTPATIENT
Start: 2018-12-20 | End: 2019-11-03 | Stop reason: ALTCHOICE

## 2018-12-20 RX ORDER — RIZATRIPTAN BENZOATE 10 MG/1
10 TABLET, ORALLY DISINTEGRATING ORAL AS NEEDED
Qty: 20 TABLET | Refills: 1 | Status: SHIPPED | OUTPATIENT
Start: 2018-12-20 | End: 2020-10-01

## 2018-12-20 NOTE — PROGRESS NOTES
[address] Patient Status:  Observation    1965 MRN [id number]   [facility] 3NE-A Attending Henri Petersen MD   Hosp Day # 0 PCP None Pcp   Date of Admission: Smoker      Smokeless tobacco: Never Used    Alcohol use: No    Drug use: No         Current Medications:    Current Facility-Administered Medications:  HYDROcodone-acetaminophen (NORCO)  MG per tab 1 tablet 1 tablet Oral Q6H PRN   ondansetron HCl (Z Lotion Apply 1 Application topically as needed for Dry Skin. Nortriptyline HCl 50 MG Oral Cap Take 2 capsules (100 mg total) by mouth nightly. HYDROcodone-acetaminophen  MG Oral Tab Take 1 tablet by mouth every 6 (six) hours as needed for Pain. Doxycycline                 Comment:rash  Ibuprofen                   Comment:Told by GI not to take NSAIDs due to ulcers  Morphine                RASH, ITCHING  Reglan [Metoclopram*    OTHER (SEE COMMENTS)    Comment:Dystonia  Tylenol With Codeine    IT round bilaterally, each is estimated at 3-4 mm and is normally reactive to light. Visual fields are full to confrontation. There is no afferent pupillary defect. The tongue is in the midline. Motor: There is no abnormal involuntary movement.     Monika Baumann migrainosus  The patient appears improved. She appears ready for discharge. She is taking p.o. She is ambulating. MR venogram is negative/benign. Blood testing is similarly benign.   I spoke to the patient about the possibility of unintended or negativ

## 2018-12-20 NOTE — DISCHARGE SUMMARY
General Medicine Discharge Summary     Patient ID:  Myles Fernandes  48year old  8/25/1965    Admit date: 12/18/2018    Discharge date and time: 12/20/2018  1:23 PM     Attending Physician: Dinah att. RN spoke to patient and patient verified last name and . Patient notified that records were received and nothing is needed before appt. Patient verbalizes understanding and was encouraged to call back with any additional questions/concerns.        MD Zaria Curran RN   Caller: Unspecified (1 week ago, 11:52 AM)             Nothing to do before the appointment.     Thanks!   Bev         (300 mg total) by mouth daily. , Print Script, Disp-30 tablet, R-0    escitalopram (LEXAPRO) 20 MG Oral Tab  Take 1 tablet (20 mg total) by mouth every evening., Print Script, Disp-30 tablet, R-0    Rizatriptan Benzoate 10 MG Oral Tablet Dispersible  Take 1 Cap  Take 600 mg by mouth 3 (three) times daily. , Historical    !! Ondansetron HCl (ZOFRAN) 4 mg tablet  Take 1 tablet (4 mg total) by mouth every 8 (eight) hours as needed for Nausea. , Print Script, Disp-30 tablet, R-0    topiramate (TOPAMAX) 25 MG Oral T

## 2019-03-25 ENCOUNTER — HOSPITAL ENCOUNTER (OUTPATIENT)
Age: 54
Discharge: HOME | End: 2019-03-25
Payer: MEDICAID

## 2019-03-25 ENCOUNTER — APPOINTMENT (OUTPATIENT)
Dept: GENERAL RADIOLOGY | Age: 54
End: 2019-03-25
Attending: PHYSICIAN ASSISTANT
Payer: MEDICAID

## 2019-03-25 VITALS
HEIGHT: 67 IN | RESPIRATION RATE: 18 BRPM | DIASTOLIC BLOOD PRESSURE: 84 MMHG | TEMPERATURE: 98 F | WEIGHT: 167 LBS | HEART RATE: 99 BPM | OXYGEN SATURATION: 98 % | SYSTOLIC BLOOD PRESSURE: 108 MMHG | BODY MASS INDEX: 26.21 KG/M2

## 2019-03-25 DIAGNOSIS — J01.40 ACUTE PANSINUSITIS, RECURRENCE NOT SPECIFIED: ICD-10-CM

## 2019-03-25 DIAGNOSIS — J45.41 MODERATE PERSISTENT ASTHMA WITH EXACERBATION: Primary | ICD-10-CM

## 2019-03-25 PROCEDURE — 71046 X-RAY EXAM CHEST 2 VIEWS: CPT | Performed by: PHYSICIAN ASSISTANT

## 2019-03-25 PROCEDURE — 99214 OFFICE O/P EST MOD 30 MIN: CPT

## 2019-03-25 PROCEDURE — 94640 AIRWAY INHALATION TREATMENT: CPT

## 2019-03-25 RX ORDER — AMOXICILLIN 875 MG/1
875 TABLET, COATED ORAL 2 TIMES DAILY
Qty: 20 TABLET | Refills: 0 | Status: SHIPPED | OUTPATIENT
Start: 2019-03-25 | End: 2019-04-04

## 2019-03-25 RX ORDER — IPRATROPIUM BROMIDE AND ALBUTEROL SULFATE 2.5; .5 MG/3ML; MG/3ML
3 SOLUTION RESPIRATORY (INHALATION) ONCE
Status: COMPLETED | OUTPATIENT
Start: 2019-03-25 | End: 2019-03-25

## 2019-03-25 RX ORDER — ALBUTEROL SULFATE 2.5 MG/3ML
2.5 SOLUTION RESPIRATORY (INHALATION) EVERY 4 HOURS PRN
Qty: 30 AMPULE | Refills: 0 | Status: SHIPPED | OUTPATIENT
Start: 2019-03-25 | End: 2019-04-24

## 2019-03-25 RX ORDER — BUTALBITAL, ACETAMINOPHEN AND CAFFEINE 50; 325; 40 MG/1; MG/1; MG/1
1-2 TABLET ORAL EVERY 6 HOURS PRN
Qty: 10 TABLET | Refills: 0 | Status: SHIPPED | OUTPATIENT
Start: 2019-03-25 | End: 2019-04-01

## 2019-03-25 RX ORDER — PREDNISONE 20 MG/1
40 TABLET ORAL DAILY
Qty: 8 TABLET | Refills: 0 | Status: SHIPPED | OUTPATIENT
Start: 2019-03-25 | End: 2019-03-29

## 2019-03-25 RX ORDER — PREDNISONE 20 MG/1
40 TABLET ORAL ONCE
Status: COMPLETED | OUTPATIENT
Start: 2019-03-25 | End: 2019-03-25

## 2019-03-25 RX ORDER — MONTELUKAST SODIUM 10 MG/1
10 TABLET ORAL NIGHTLY
Qty: 30 TABLET | Refills: 0 | Status: SHIPPED | OUTPATIENT
Start: 2019-03-25 | End: 2022-01-19

## 2019-03-25 NOTE — ED INITIAL ASSESSMENT (HPI)
Pt here c/o coughing for last 4 days. States wheezing is worse today. Took prednisone 20mg today and used albuterol inhaler. Denies fever.

## 2019-03-25 NOTE — ED PROVIDER NOTES
Patient Seen in: [facility] [place] [facility]    History   Patient presents with:  Cough/URI    Stated Complaint: asthmatic/uri/trouble breathing x 2 days    HPI    46-year-old female with a history of asthma here with a complaint of a 2-day hist above.    Physical Exam     ED Triage Vitals [03/25/19 1614]   /84   Pulse 76   Resp 18   Temp 98 °F (36.7 °C)   Temp src Oral   SpO2 98 %   O2 Device None (Room air)       Current:/84   Pulse 76   Temp 98 °F (36.7 °C) (Oral)   Resp 18   Ht 170 for the last few weeks. Patient had a fever three days ago. Patient has asthma. FINDINGS:  LUNGS:  No focal consolidation. Normal vascularity. CARDIAC:  Normal size cardiac silhouette. MEDIASTINUM:  Normal. PLEURA:  Normal.  No pleural effusions.  BONES Breath. Qty: 30 ampule Refills: 0    predniSONE 20 MG Oral Tab  Take 2 tablets (40 mg total) by mouth daily for 4 days.   Qty: 8 tablet Refills: 0

## 2019-04-01 ENCOUNTER — HOSPITAL ENCOUNTER (OUTPATIENT)
Age: 54
Discharge: HOME | End: 2019-04-01
Attending: EMERGENCY MEDICINE
Payer: MEDICAID

## 2019-04-01 ENCOUNTER — APPOINTMENT (OUTPATIENT)
Dept: GENERAL RADIOLOGY | Facility: HOSPITAL | Age: 54
End: 2019-04-01
Payer: MEDICAID

## 2019-04-01 ENCOUNTER — HOSPITAL ENCOUNTER (EMERGENCY)
Facility: HOSPITAL | Age: 54
Discharge: HOME | End: 2019-04-01
Payer: MEDICAID

## 2019-04-01 VITALS
RESPIRATION RATE: 16 BRPM | BODY MASS INDEX: 24.25 KG/M2 | SYSTOLIC BLOOD PRESSURE: 132 MMHG | HEART RATE: 72 BPM | OXYGEN SATURATION: 98 % | HEIGHT: 68 IN | TEMPERATURE: 97 F | WEIGHT: 160 LBS | DIASTOLIC BLOOD PRESSURE: 80 MMHG

## 2019-04-01 VITALS
OXYGEN SATURATION: 98 % | TEMPERATURE: 101 F | WEIGHT: 160 LBS | BODY MASS INDEX: 24.25 KG/M2 | DIASTOLIC BLOOD PRESSURE: 74 MMHG | RESPIRATION RATE: 17 BRPM | SYSTOLIC BLOOD PRESSURE: 120 MMHG | HEART RATE: 70 BPM | HEIGHT: 68 IN

## 2019-04-01 DIAGNOSIS — G43.009 MIGRAINE WITHOUT AURA AND WITHOUT STATUS MIGRAINOSUS, NOT INTRACTABLE: Primary | ICD-10-CM

## 2019-04-01 DIAGNOSIS — G43.809 OTHER MIGRAINE WITHOUT STATUS MIGRAINOSUS, NOT INTRACTABLE: ICD-10-CM

## 2019-04-01 DIAGNOSIS — B34.9 VIRAL SYNDROME: Primary | ICD-10-CM

## 2019-04-01 PROCEDURE — 94640 AIRWAY INHALATION TREATMENT: CPT

## 2019-04-01 PROCEDURE — 99214 OFFICE O/P EST MOD 30 MIN: CPT

## 2019-04-01 PROCEDURE — 96375 TX/PRO/DX INJ NEW DRUG ADDON: CPT

## 2019-04-01 PROCEDURE — 96374 THER/PROPH/DIAG INJ IV PUSH: CPT

## 2019-04-01 PROCEDURE — 96361 HYDRATE IV INFUSION ADD-ON: CPT

## 2019-04-01 PROCEDURE — 71045 X-RAY EXAM CHEST 1 VIEW: CPT

## 2019-04-01 PROCEDURE — 99284 EMERGENCY DEPT VISIT MOD MDM: CPT

## 2019-04-01 RX ORDER — ONDANSETRON 2 MG/ML
4 INJECTION INTRAMUSCULAR; INTRAVENOUS ONCE
Status: COMPLETED | OUTPATIENT
Start: 2019-04-01 | End: 2019-04-01

## 2019-04-01 RX ORDER — ACETAMINOPHEN 500 MG
1000 TABLET ORAL ONCE
Status: COMPLETED | OUTPATIENT
Start: 2019-04-01 | End: 2019-04-01

## 2019-04-01 RX ORDER — BUTALBITAL, ACETAMINOPHEN AND CAFFEINE 50; 325; 40 MG/1; MG/1; MG/1
1-2 TABLET ORAL EVERY 6 HOURS PRN
Qty: 10 TABLET | Refills: 0 | Status: SHIPPED | OUTPATIENT
Start: 2019-04-01 | End: 2019-04-08

## 2019-04-01 RX ORDER — ONDANSETRON 4 MG/1
4 TABLET, ORALLY DISINTEGRATING ORAL EVERY 6 HOURS PRN
Qty: 10 TABLET | Refills: 0 | Status: SHIPPED | OUTPATIENT
Start: 2019-04-01 | End: 2019-04-06

## 2019-04-01 RX ORDER — SODIUM CHLORIDE 9 MG/ML
1000 INJECTION, SOLUTION INTRAVENOUS ONCE
Status: COMPLETED | OUTPATIENT
Start: 2019-04-01 | End: 2019-04-01

## 2019-04-01 RX ORDER — DIPHENHYDRAMINE HYDROCHLORIDE 50 MG/ML
25 INJECTION INTRAMUSCULAR; INTRAVENOUS ONCE
Status: COMPLETED | OUTPATIENT
Start: 2019-04-01 | End: 2019-04-01

## 2019-04-01 RX ORDER — BENZONATATE 100 MG/1
100 CAPSULE ORAL 3 TIMES DAILY PRN
Qty: 30 CAPSULE | Refills: 0 | Status: SHIPPED | OUTPATIENT
Start: 2019-04-01 | End: 2019-04-22 | Stop reason: ALTCHOICE

## 2019-04-01 RX ORDER — LORAZEPAM 2 MG/ML
0.5 INJECTION INTRAMUSCULAR ONCE
Status: COMPLETED | OUTPATIENT
Start: 2019-04-01 | End: 2019-04-01

## 2019-04-01 RX ORDER — DEXAMETHASONE SODIUM PHOSPHATE 4 MG/ML
10 VIAL (ML) INJECTION ONCE
Status: COMPLETED | OUTPATIENT
Start: 2019-04-01 | End: 2019-04-01

## 2019-04-01 RX ORDER — IPRATROPIUM BROMIDE AND ALBUTEROL SULFATE 2.5; .5 MG/3ML; MG/3ML
3 SOLUTION RESPIRATORY (INHALATION) ONCE
Status: COMPLETED | OUTPATIENT
Start: 2019-04-01 | End: 2019-04-01

## 2019-04-01 RX ORDER — ALBUTEROL SULFATE 2.5 MG/3ML
2.5 SOLUTION RESPIRATORY (INHALATION) ONCE
Status: COMPLETED | OUTPATIENT
Start: 2019-04-01 | End: 2019-04-01

## 2019-04-01 NOTE — ED PROVIDER NOTES
Patient Seen in: [facility] Emergency Department    History   Patient presents with:  Headache (neurologic)  Cough/URI    Stated Complaint: pt c/o migrane tonight and has been dx with resp.  infection approx 2 weeks ago     HPI    59-year-old with mi APPENDECTOMY     • APPENDECTOMY     • COCCYX N/A 8/19/2015    Performed by Bryan Sawyer MD at [facility]   • COCCYX N/A 1/19/2015    Performed by Bryan Sawyer MD at [address]     • OTHER and oriented x 3 with clear speech           ED Course   Labs Reviewed - No data to display       Xr Chest Pa + Lat Chest (cpt=71046)    Result Date: 3/25/2019  PROCEDURE:  XR CHEST PA + LAT CHEST (CPT=71046)  INDICATIONS:  asthmatic/uri/trouble breathing present. There was no indication for further evaluation, treatment or admission on an emergency basis. The usual and customary discharge instuctions were discussed given the patient's ER course.   We discussed signs and symptoms that should prompt the

## 2019-04-02 NOTE — ED PROVIDER NOTES
Patient Seen in: [facility]    History   Patient presents with:  Fever (infectious)  Headache (neurologic)    Stated Complaint: migraines / chest congestion    HPI    68-year-old female with history of frequent migraine headache and is not pertinent to presenting problem.     Social History    Tobacco Use      Smoking status: Never Smoker      Smokeless tobacco: Never Used    Alcohol use: No    Drug use: No      Review of Systems    Positive for stated complaint: migraines / chest Tessalon. Follow-up with neurology if persistent headache.   Return to the ER for worsening or changing symptoms prior to follow-up        Disposition and Plan     Clinical Impression:  Migraine without aura and without status migrainosus, not intractabl

## 2019-04-02 NOTE — ED INITIAL ASSESSMENT (HPI)
Pt seen in the ED at 0300 for fever, migraine - given IV treatment; pt called neurologist and told to come in to see if we \"could do anything\" for intractable migraine; cxr neg today, flu swab neg    Pt recently treated for URI    Vomiting this morning

## 2019-04-02 NOTE — ED NOTES
Pt instructed not to drive, operate heavy machinery, or drink alcohol on fioricet; pt verbalized understanding of instructions.

## 2019-04-22 ENCOUNTER — HOSPITAL ENCOUNTER (OUTPATIENT)
Age: 54
Discharge: HOME | End: 2019-04-22
Payer: MEDICAID

## 2019-04-22 VITALS
TEMPERATURE: 98 F | OXYGEN SATURATION: 100 % | SYSTOLIC BLOOD PRESSURE: 98 MMHG | HEART RATE: 80 BPM | RESPIRATION RATE: 18 BRPM | DIASTOLIC BLOOD PRESSURE: 56 MMHG

## 2019-04-22 DIAGNOSIS — K52.9 GASTROENTERITIS: Primary | ICD-10-CM

## 2019-04-22 DIAGNOSIS — G43.009 MIGRAINE WITHOUT AURA AND WITHOUT STATUS MIGRAINOSUS, NOT INTRACTABLE: ICD-10-CM

## 2019-04-22 PROCEDURE — S0028 INJECTION, FAMOTIDINE, 20 MG: HCPCS

## 2019-04-22 PROCEDURE — 96361 HYDRATE IV INFUSION ADD-ON: CPT

## 2019-04-22 PROCEDURE — 96375 TX/PRO/DX INJ NEW DRUG ADDON: CPT

## 2019-04-22 PROCEDURE — 96374 THER/PROPH/DIAG INJ IV PUSH: CPT

## 2019-04-22 PROCEDURE — 81002 URINALYSIS NONAUTO W/O SCOPE: CPT | Performed by: PHYSICIAN ASSISTANT

## 2019-04-22 PROCEDURE — 99214 OFFICE O/P EST MOD 30 MIN: CPT

## 2019-04-22 RX ORDER — BUTALBITAL, ACETAMINOPHEN AND CAFFEINE 50; 325; 40 MG/1; MG/1; MG/1
1 TABLET ORAL EVERY 6 HOURS PRN
Qty: 10 TABLET | Refills: 0 | Status: SHIPPED | OUTPATIENT
Start: 2019-04-22 | End: 2019-04-29

## 2019-04-22 RX ORDER — FAMOTIDINE 10 MG/ML
20 INJECTION, SOLUTION INTRAVENOUS ONCE
Status: COMPLETED | OUTPATIENT
Start: 2019-04-22 | End: 2019-04-22

## 2019-04-22 RX ORDER — KETOROLAC TROMETHAMINE 30 MG/ML
30 INJECTION, SOLUTION INTRAMUSCULAR; INTRAVENOUS ONCE
Status: COMPLETED | OUTPATIENT
Start: 2019-04-22 | End: 2019-04-22

## 2019-04-22 RX ORDER — SODIUM CHLORIDE 9 MG/ML
1000 INJECTION, SOLUTION INTRAVENOUS ONCE
Status: COMPLETED | OUTPATIENT
Start: 2019-04-22 | End: 2019-04-22

## 2019-04-22 RX ORDER — ONDANSETRON 4 MG/1
4 TABLET, ORALLY DISINTEGRATING ORAL EVERY 4 HOURS PRN
Qty: 10 TABLET | Refills: 0 | Status: SHIPPED | OUTPATIENT
Start: 2019-04-22 | End: 2019-04-29

## 2019-04-22 RX ORDER — ONDANSETRON 2 MG/ML
4 INJECTION INTRAMUSCULAR; INTRAVENOUS ONCE
Status: COMPLETED | OUTPATIENT
Start: 2019-04-22 | End: 2019-04-22

## 2019-04-22 NOTE — ED INITIAL ASSESSMENT (HPI)
Yesterday onset abdominal cramping followed by diarrhea then emesis. Took bentyl and zofran but had another emesis 3am.  States abdominal distress is causing headache migraine.

## 2019-04-22 NOTE — ED PROVIDER NOTES
Patient Seen in: [facility]    History   Patient presents with:  Nausea/Vomiting/Diarrhea (gastrointestinal)  Headache (neurologic)    Stated Complaint: flu     HPI    Gabriel Patterson a 51-year-old female with a history of frequent brian HPI.  Constitutional and vital signs reviewed. All other systems reviewed and negative except as noted above.     Physical Exam     ED Triage Vitals [04/22/19 0949]   /90   Pulse 77   Resp 16   Temp 98.1 °F (36.7 °C)   Temp src Oral   SpO2 98 % Result Value    Urine Color Mana (*)     Protein urine 30  (*)     All other components within normal limits           MDM   Patient was provided with an IV bolus, she was able to give a urine sample after the IV bolus which shows no evidence of acute in regarding her diagnosis, expectations, follow up, and return to the ER precautions. I explained to the patient that emergent conditions may arise to return to the immediate care or ER for new, worsening or any persistent conditions.   I've explained the im

## 2019-07-21 ENCOUNTER — HOSPITAL ENCOUNTER (OUTPATIENT)
Age: 54
Discharge: HOME | End: 2019-07-21
Attending: FAMILY MEDICINE
Payer: MEDICAID

## 2019-07-21 VITALS
SYSTOLIC BLOOD PRESSURE: 126 MMHG | RESPIRATION RATE: 18 BRPM | TEMPERATURE: 98 F | HEART RATE: 80 BPM | DIASTOLIC BLOOD PRESSURE: 80 MMHG | OXYGEN SATURATION: 99 %

## 2019-07-21 DIAGNOSIS — M54.2 NECK PAIN: ICD-10-CM

## 2019-07-21 DIAGNOSIS — M70.60 TROCHANTERIC BURSITIS: ICD-10-CM

## 2019-07-21 DIAGNOSIS — F32.5 MAJOR DEPRESSIVE DISORDER IN FULL REMISSION, UNSPECIFIED WHETHER RECURRENT (HCC): ICD-10-CM

## 2019-07-21 DIAGNOSIS — G43.709 CHRONIC MIGRAINE WITHOUT AURA WITHOUT STATUS MIGRAINOSUS, NOT INTRACTABLE: Primary | ICD-10-CM

## 2019-07-21 DIAGNOSIS — M48.02 CERVICAL SPINAL STENOSIS: ICD-10-CM

## 2019-07-21 PROCEDURE — 99214 OFFICE O/P EST MOD 30 MIN: CPT

## 2019-07-21 PROCEDURE — 96375 TX/PRO/DX INJ NEW DRUG ADDON: CPT

## 2019-07-21 PROCEDURE — 96361 HYDRATE IV INFUSION ADD-ON: CPT

## 2019-07-21 PROCEDURE — 96374 THER/PROPH/DIAG INJ IV PUSH: CPT

## 2019-07-21 RX ORDER — ESCITALOPRAM OXALATE 20 MG/1
20 TABLET ORAL EVERY EVENING
Qty: 30 TABLET | Refills: 0 | Status: SHIPPED | OUTPATIENT
Start: 2019-07-21

## 2019-07-21 RX ORDER — ONDANSETRON 2 MG/ML
4 INJECTION INTRAMUSCULAR; INTRAVENOUS ONCE
Status: COMPLETED | OUTPATIENT
Start: 2019-07-21 | End: 2019-07-21

## 2019-07-21 RX ORDER — SODIUM CHLORIDE 9 MG/ML
1000 INJECTION, SOLUTION INTRAVENOUS ONCE
Status: COMPLETED | OUTPATIENT
Start: 2019-07-21 | End: 2019-07-21

## 2019-07-21 RX ORDER — KETOROLAC TROMETHAMINE 30 MG/ML
30 INJECTION, SOLUTION INTRAMUSCULAR; INTRAVENOUS ONCE
Status: COMPLETED | OUTPATIENT
Start: 2019-07-21 | End: 2019-07-21

## 2019-07-21 RX ORDER — BUTALBITAL, ACETAMINOPHEN AND CAFFEINE 50; 325; 40 MG/1; MG/1; MG/1
1 TABLET ORAL EVERY 4 HOURS PRN
Qty: 30 TABLET | Refills: 0 | Status: SHIPPED | OUTPATIENT
Start: 2019-07-21 | End: 2019-10-01

## 2019-07-21 RX ORDER — DIPHENHYDRAMINE HYDROCHLORIDE 50 MG/ML
25 INJECTION INTRAMUSCULAR; INTRAVENOUS ONCE
Status: COMPLETED | OUTPATIENT
Start: 2019-07-21 | End: 2019-07-21

## 2019-07-21 RX ORDER — ONDANSETRON 4 MG/1
4 TABLET, ORALLY DISINTEGRATING ORAL EVERY 4 HOURS PRN
Qty: 10 TABLET | Refills: 0 | Status: SHIPPED | OUTPATIENT
Start: 2019-07-21 | End: 2019-07-28

## 2019-07-21 RX ORDER — ACETAMINOPHEN 500 MG
1000 TABLET ORAL ONCE
Status: COMPLETED | OUTPATIENT
Start: 2019-07-21 | End: 2019-07-21

## 2019-07-21 NOTE — ED PROVIDER NOTES
Patient Seen in: [address]    History   Patient presents with:  Headache (neurologic)    Stated Complaint: MIGRAINES    HPI    65-year-old female with history of IBS, fibromyalgia, and recurrent migraines presents IC secondary negative except as noted above.     Physical Exam     ED Triage Vitals [07/21/19 1537]   /80   Pulse 80   Resp 18   Temp 98.2 °F (36.8 °C)   Temp src Oral   SpO2 99 %   O2 Device None (Room air)       Current:/80   Pulse 80   Temp 98.2 °F (36.8 Prescribed:  Current Discharge Medication List    START taking these medications    ondansetron 4 MG Oral Tablet Dispersible  Take 1 tablet (4 mg total) by mouth every 4 (four) hours as needed for Nausea.   Qty: 10 tablet Refills: 0

## 2019-07-21 NOTE — ED INITIAL ASSESSMENT (HPI)
C/O migraine over the last week that keeps \"cycling\" over the last 4 days. Has taken home meds w/o relief.

## 2019-08-27 ENCOUNTER — OFFICE VISIT (OUTPATIENT)
Dept: OCCUPATIONAL MEDICINE | Age: 54
End: 2019-08-27
Attending: PHYSICIAN ASSISTANT
Payer: MEDICAID

## 2019-08-29 ENCOUNTER — OFFICE VISIT (OUTPATIENT)
Dept: OCCUPATIONAL MEDICINE | Age: 54
End: 2019-08-29
Attending: FAMILY MEDICINE
Payer: MEDICAID

## 2019-08-29 ENCOUNTER — OFFICE VISIT (OUTPATIENT)
Dept: OCCUPATIONAL MEDICINE | Age: 54
End: 2019-08-29
Attending: FAMILY MEDICINE

## 2019-09-09 ENCOUNTER — HOSPITAL ENCOUNTER (EMERGENCY)
Facility: HOSPITAL | Age: 54
Discharge: HOME | End: 2019-09-09
Payer: MEDICAID

## 2019-09-09 ENCOUNTER — APPOINTMENT (OUTPATIENT)
Dept: GENERAL RADIOLOGY | Facility: HOSPITAL | Age: 54
End: 2019-09-09
Payer: MEDICAID

## 2019-09-09 VITALS
OXYGEN SATURATION: 98 % | HEIGHT: 67 IN | SYSTOLIC BLOOD PRESSURE: 125 MMHG | WEIGHT: 160 LBS | RESPIRATION RATE: 17 BRPM | HEART RATE: 70 BPM | BODY MASS INDEX: 25.11 KG/M2 | TEMPERATURE: 97 F | DIASTOLIC BLOOD PRESSURE: 80 MMHG

## 2019-09-09 DIAGNOSIS — S60.212A CONTUSION OF LEFT WRIST, INITIAL ENCOUNTER: Primary | ICD-10-CM

## 2019-09-09 DIAGNOSIS — G43.909 MIGRAINE WITHOUT STATUS MIGRAINOSUS, NOT INTRACTABLE, UNSPECIFIED MIGRAINE TYPE: ICD-10-CM

## 2019-09-09 PROCEDURE — 99284 EMERGENCY DEPT VISIT MOD MDM: CPT

## 2019-09-09 PROCEDURE — 99283 EMERGENCY DEPT VISIT LOW MDM: CPT

## 2019-09-09 PROCEDURE — 73110 X-RAY EXAM OF WRIST: CPT

## 2019-09-09 RX ORDER — BUTALBITAL, ACETAMINOPHEN AND CAFFEINE 50; 325; 40 MG/1; MG/1; MG/1
1-2 TABLET ORAL EVERY 6 HOURS PRN
Qty: 10 TABLET | Refills: 0 | Status: SHIPPED | OUTPATIENT
Start: 2019-09-09 | End: 2019-09-16

## 2019-09-09 NOTE — ED PROVIDER NOTES
Patient Seen in: [facility] Emergency Department    History   Patient presents with:  Upper Extremity Injury (musculoskeletal)    Stated Complaint: left wrist pain    HPI    Patient is a 24-year-old female who states that yesterday she was lifting n 97.1 °F (36.2 °C)   Temp src Oral   SpO2 98 %   O2 Device None (Room air)       Current:/80   Pulse 70   Temp 97.1 °F (36.2 °C) (Oral)   Resp 17   Ht 170.2 cm (5' 7\")   Wt 72.6 kg   LMP 12/19/2013   SpO2 98%   BMI 25.06 kg/m²         Physical Exam needed for Pain. Qty: 10 tablet Refills: 0    !! - Potential duplicate medications found. Please discuss with provider.

## 2019-09-09 NOTE — ED INITIAL ASSESSMENT (HPI)
Pt here for L wrist pain from using brick pavers yesterday. She states she didn't have enough to drink and then got a migraine. She is out of migraine meds and states she fell and hit wrist on cabinet making it worse.  So is here for migraine and L wrist Pt to try glipizide 5mg one po BID and 2 refills, remind pt can't skip any meals b/c side affect is LOW sugar if not eating.

## 2019-09-09 NOTE — ED NOTES
Patient c/o pain to lateral left wrist. States difficulty in moving 5th digit of left hand. CMS intact.

## 2019-10-01 ENCOUNTER — HOSPITAL ENCOUNTER (OUTPATIENT)
Age: 54
Discharge: HOME | End: 2019-10-01
Payer: MEDICAID

## 2019-10-01 ENCOUNTER — APPOINTMENT (OUTPATIENT)
Dept: GENERAL RADIOLOGY | Age: 54
End: 2019-10-01
Attending: PHYSICIAN ASSISTANT
Payer: MEDICAID

## 2019-10-01 VITALS
WEIGHT: 160 LBS | TEMPERATURE: 98 F | RESPIRATION RATE: 16 BRPM | OXYGEN SATURATION: 98 % | SYSTOLIC BLOOD PRESSURE: 123 MMHG | HEIGHT: 67 IN | BODY MASS INDEX: 25.11 KG/M2 | DIASTOLIC BLOOD PRESSURE: 95 MMHG | HEART RATE: 74 BPM

## 2019-10-01 DIAGNOSIS — J40 BRONCHITIS: ICD-10-CM

## 2019-10-01 DIAGNOSIS — J01.90 ACUTE NON-RECURRENT SINUSITIS, UNSPECIFIED LOCATION: Primary | ICD-10-CM

## 2019-10-01 PROCEDURE — 99213 OFFICE O/P EST LOW 20 MIN: CPT

## 2019-10-01 PROCEDURE — 99214 OFFICE O/P EST MOD 30 MIN: CPT

## 2019-10-01 RX ORDER — AMOXICILLIN AND CLAVULANATE POTASSIUM 875; 125 MG/1; MG/1
1 TABLET, FILM COATED ORAL 2 TIMES DAILY
Qty: 14 TABLET | Refills: 0 | Status: SHIPPED | OUTPATIENT
Start: 2019-10-01 | End: 2019-10-08

## 2019-10-01 RX ORDER — BUTALBITAL, ACETAMINOPHEN AND CAFFEINE 50; 325; 40 MG/1; MG/1; MG/1
1 TABLET ORAL EVERY 6 HOURS PRN
Qty: 10 TABLET | Refills: 0 | Status: SHIPPED | OUTPATIENT
Start: 2019-10-01

## 2019-10-01 RX ORDER — BENZONATATE 100 MG/1
CAPSULE ORAL 3 TIMES DAILY PRN
Qty: 30 CAPSULE | Refills: 0 | Status: SHIPPED | OUTPATIENT
Start: 2019-10-01 | End: 2019-10-31

## 2019-10-01 RX ORDER — ALBUTEROL SULFATE 90 UG/1
1 AEROSOL, METERED RESPIRATORY (INHALATION) EVERY 6 HOURS PRN
Qty: 1 INHALER | Refills: 0 | Status: SHIPPED | OUTPATIENT
Start: 2019-10-01

## 2019-10-02 NOTE — ED PROVIDER NOTES
Patient Seen in: [facility] In [address] [place] [address],[address]      History   Patient presents with:  Cough/URI  Headache (neurologic)    Stated Complaint: CONGESTION/COUGH/MIGRAINE X 5 DAYS    HPI    CHIEF COMPLAINT: Runny nose, sinus congestion, cough and migra • Back pain    • Depression    • Esophageal reflux    • Extrinsic asthma, unspecified    • Fibromyalgia    • Fibromyalgia    • IBS               Past Surgical History:   Procedure Laterality Date   • APPENDECTOMY     • APPENDECTOMY     • COCCYX N/A 8/19 rate and rhythm  Gastrointestinal:  abdomen is soft and non tender, no masses, bowel sounds normal.  No rebound, guarding or rigidity noted. No abdominal distention. No pulsatile masses. Neurological:  Grossly intact, no deficits.   Cranial nerves II thro Tab  Take 1 tablet by mouth 2 (two) times daily for 7 days. Qty: 14 tablet Refills: 0    benzonatate 100 MG Oral Cap  Take 1-2 capsules (100-200 mg total) by mouth 3 (three) times daily as needed for cough.   Qty: 30 capsule Refills: 0      After patient w

## 2019-10-02 NOTE — ED INITIAL ASSESSMENT (HPI)
Pt with cold symptoms since thurs, chest congestion since Sunday; fever to 101 thur-fri resolved    Used inhaler 1 hour ago - GERARD - used inhaler 6 times today

## 2019-11-03 ENCOUNTER — HOSPITAL ENCOUNTER (OUTPATIENT)
Age: 54
Discharge: HOME | End: 2019-11-03
Attending: EMERGENCY MEDICINE
Payer: MEDICAID

## 2019-11-03 VITALS
DIASTOLIC BLOOD PRESSURE: 104 MMHG | TEMPERATURE: 98 F | OXYGEN SATURATION: 97 % | RESPIRATION RATE: 20 BRPM | SYSTOLIC BLOOD PRESSURE: 174 MMHG | HEART RATE: 98 BPM

## 2019-11-03 DIAGNOSIS — G43.809 OTHER MIGRAINE WITHOUT STATUS MIGRAINOSUS, NOT INTRACTABLE: Primary | ICD-10-CM

## 2019-11-03 PROCEDURE — 99213 OFFICE O/P EST LOW 20 MIN: CPT

## 2019-11-03 PROCEDURE — 99214 OFFICE O/P EST MOD 30 MIN: CPT

## 2019-11-03 RX ORDER — RIZATRIPTAN BENZOATE 5 MG/1
5 TABLET ORAL AS NEEDED
Qty: 10 TABLET | Refills: 0 | Status: SHIPPED | OUTPATIENT
Start: 2019-11-03 | End: 2020-10-01

## 2019-11-03 RX ORDER — BUTALBITAL, ACETAMINOPHEN AND CAFFEINE 50; 325; 40 MG/1; MG/1; MG/1
1-2 TABLET ORAL EVERY 6 HOURS PRN
Qty: 10 TABLET | Refills: 0 | Status: SHIPPED | OUTPATIENT
Start: 2019-11-03 | End: 2019-11-10

## 2019-11-03 RX ORDER — ALPRAZOLAM 0.25 MG/1
0.25 TABLET ORAL NIGHTLY PRN
COMMUNITY

## 2019-11-03 RX ORDER — ONDANSETRON 4 MG/1
4 TABLET, ORALLY DISINTEGRATING ORAL EVERY 4 HOURS PRN
Qty: 10 TABLET | Refills: 0 | Status: SHIPPED | OUTPATIENT
Start: 2019-11-03 | End: 2019-11-10

## 2019-11-03 NOTE — ED INITIAL ASSESSMENT (HPI)
Pt c/o migraine headache. States was seen at [facility] ED last night - was given IV. States the prescriptions given are not good for her because she is allergic. Here today requesting refill of her migraine headache.

## 2019-11-03 NOTE — ED PROVIDER NOTES
Patient Seen in: [facility] In [facility] END      History   Patient presents with:  Headache (neurologic)    Stated Complaint: MIGRAINES X 7 DAYS    HPI    Patient is a 16-year-old woman with a history of long-standing migraines as well as fiber (!) 174/104   Pulse 98   Resp 20   Temp 97.6 °F (36.4 °C)   Temp src Temporal   SpO2 97 %   O2 Device None (Room air)       Current:BP (!) 174/104   Pulse 98   Temp 97.6 °F (36.4 °C) (Temporal)   Resp 20   LMP 12/19/2013   SpO2 97%         Physical Exam by mouth every 6 (six) hours as needed for Pain. Qty: 10 tablet Refills: 0    Rizatriptan Benzoate (MAXALT) 5 MG Oral Tab  Take 1 tablet (5 mg total) by mouth as needed for Migraine (5 mg at the onset of headache. Can repeat dose after 2 hours. ).   Qty: 1

## 2020-02-18 ENCOUNTER — OFFICE VISIT (OUTPATIENT)
Dept: SURGERY | Facility: CLINIC | Age: 55
End: 2020-02-18
Payer: MEDICAID

## 2020-02-18 VITALS — TEMPERATURE: 98 F | DIASTOLIC BLOOD PRESSURE: 96 MMHG | SYSTOLIC BLOOD PRESSURE: 138 MMHG | HEART RATE: 74 BPM

## 2020-02-18 DIAGNOSIS — Z90.721 H/O UNILATERAL OOPHORECTOMY: ICD-10-CM

## 2020-02-18 DIAGNOSIS — R10.33 PERIUMBILICAL ABDOMINAL PAIN: Primary | ICD-10-CM

## 2020-02-18 DIAGNOSIS — Z98.890 H/O VENTRAL HERNIA REPAIR: ICD-10-CM

## 2020-02-18 DIAGNOSIS — Z90.49 HISTORY OF APPENDECTOMY: ICD-10-CM

## 2020-02-18 DIAGNOSIS — Z87.19 H/O VENTRAL HERNIA REPAIR: ICD-10-CM

## 2020-02-18 PROCEDURE — 99204 OFFICE O/P NEW MOD 45 MIN: CPT | Performed by: SURGERY

## 2020-02-18 NOTE — H&P
New Patient Visit Note       Active Problems      1. Periumbilical abdominal pain        Chief Complaint   Patient presents with:  Hernia: new patient ventral hernia -- States hiccups and refulx happening. States pain stinging/pulling 7/10 pain.  Denies fev needed for Wheezing., Disp: 1 Inhaler, Rfl: 0  •  Butalbital-APAP-Caffeine -40 MG Oral Tab, Take 1 tablet by mouth every 6 (six) hours as needed for Pain., Disp: 10 tablet, Rfl: 0  •  escitalopram (LEXAPRO) 20 MG Oral Tab, Take 1 tablet (20 mg total) syncope and light-headedness. Hematological: Negative for adenopathy. Does not bruise/bleed easily. Psychiatric/Behavioral: Negative for confusion, hallucinations and sleep disturbance.        Physical Findings   BP (!) 138/96   Pulse 74   Temp 97.9 °F patient states she had a CT scan completed of her abdomen in 2016. The results were reviewed without any indication of a hernia. The patient's medical history is significant for asthma and depression.   The patient's surgical history includes an abdomin

## 2020-02-19 ENCOUNTER — HOSPITAL ENCOUNTER (OUTPATIENT)
Age: 55
Discharge: HOME | End: 2020-02-19
Payer: MEDICAID

## 2020-02-19 ENCOUNTER — TELEPHONE (OUTPATIENT)
Dept: SURGERY | Facility: CLINIC | Age: 55
End: 2020-02-19

## 2020-02-19 VITALS
OXYGEN SATURATION: 98 % | TEMPERATURE: 98 F | BODY MASS INDEX: 25.76 KG/M2 | WEIGHT: 170 LBS | RESPIRATION RATE: 18 BRPM | HEIGHT: 68 IN | HEART RATE: 81 BPM

## 2020-02-19 DIAGNOSIS — R23.2 HOT FLASHES: Primary | ICD-10-CM

## 2020-02-19 PROCEDURE — 99213 OFFICE O/P EST LOW 20 MIN: CPT

## 2020-02-19 PROCEDURE — 99212 OFFICE O/P EST SF 10 MIN: CPT

## 2020-02-19 NOTE — ED PROVIDER NOTES
Patient Seen in: [facility]      History   Patient presents with:   Anxiety    Stated Complaint: Hormonal x 2 weeks    HPI    This is a 82-year-old female who presents today with complaint of hot flashes and inability to sleep th ED Triage Vitals [02/19/20 1526]   BP    Pulse 81   Resp 18   Temp 97.8 °F (36.6 °C)   Temp src Oral   SpO2 98 %   O2 Device None (Room air)       Current:Pulse 81   Temp 97.8 °F (36.6 °C) (Oral)   Resp 18   Ht 172.7 cm (5' 8\")   Wt 77.1 kg   LMP 12/1 labs and prescribed the estrogen she is requesting. Procedures    Disposition time:  9271     The patient is feeling much better and feels comfortable going home to follow-up as an outpatient.   Patient will return for worse condition or any concerns

## 2020-02-19 NOTE — ED INITIAL ASSESSMENT (HPI)
C/O feeling anxious when she has hot flashes at night. Sts that it stops her from sleeping and she is getting very upset and anxious as a result.

## 2020-02-19 NOTE — TELEPHONE ENCOUNTER
Completed prior authorization for CT abd/pelvis CPT 94933 ordered by Dr. Silver Corcoran via [facility]. Authorization Number: [id number]  Expiration Date: 4/4/2020    Called patient to notify. LMTCB. Paperwork placed in scanning.

## 2020-02-20 ENCOUNTER — MED REC SCAN ONLY (OUTPATIENT)
Dept: SURGERY | Facility: CLINIC | Age: 55
End: 2020-02-20

## 2020-02-20 ENCOUNTER — HOSPITAL ENCOUNTER (OUTPATIENT)
Dept: CT IMAGING | Age: 55
Discharge: HOME | End: 2020-02-20
Attending: SURGERY
Payer: MEDICAID

## 2020-02-20 DIAGNOSIS — R10.33 PERIUMBILICAL ABDOMINAL PAIN: ICD-10-CM

## 2020-02-20 LAB — CREAT BLD-MCNC: 0.6 MG/DL (ref 0.55–1.02)

## 2020-02-20 PROCEDURE — 82565 ASSAY OF CREATININE: CPT

## 2020-02-20 PROCEDURE — 74177 CT ABD & PELVIS W/CONTRAST: CPT | Performed by: SURGERY

## 2020-02-25 ENCOUNTER — OFFICE VISIT (OUTPATIENT)
Dept: SURGERY | Facility: CLINIC | Age: 55
End: 2020-02-25
Payer: MEDICAID

## 2020-02-25 VITALS — TEMPERATURE: 98 F

## 2020-02-25 DIAGNOSIS — R10.33 PERIUMBILICAL ABDOMINAL PAIN: Primary | ICD-10-CM

## 2020-02-25 PROCEDURE — 99213 OFFICE O/P EST LOW 20 MIN: CPT | Performed by: SURGERY

## 2020-03-05 NOTE — PROGRESS NOTES
Follow Up Visit Note       Active Problems      1.  Periumbilical abdominal pain          Chief Complaint   Patient presents with:  Results: Here for CT Abdomen Pelvis Results       Allergies  Sanders Rosie is allergic to crabs (crustaceans); dairy products; dox 6 (six) hours as needed for Pain., Disp: 10 tablet, Rfl: 0  •  escitalopram (LEXAPRO) 20 MG Oral Tab, Take 1 tablet (20 mg total) by mouth every evening., Disp: 30 tablet, Rfl: 0  •  Metoprolol Succinate 100 MG Oral Capsule ER 24 Hour Sprinkle, metoprolol (primary encounter diagnosis)    Plan     No indication for general surgical intervention at this time.   The patient will follow-up with GI for further evaluation   Anika Magallanes will follow-up with me as needed    Thank you for allowing me to participate in you

## 2020-03-22 ENCOUNTER — HOSPITAL ENCOUNTER (OUTPATIENT)
Age: 55
Discharge: HOME | End: 2020-03-22
Attending: EMERGENCY MEDICINE
Payer: MEDICAID

## 2020-03-22 ENCOUNTER — APPOINTMENT (OUTPATIENT)
Dept: GENERAL RADIOLOGY | Age: 55
End: 2020-03-22
Attending: EMERGENCY MEDICINE
Payer: MEDICAID

## 2020-03-22 VITALS
BODY MASS INDEX: 25.9 KG/M2 | TEMPERATURE: 98 F | WEIGHT: 165 LBS | HEIGHT: 67 IN | SYSTOLIC BLOOD PRESSURE: 141 MMHG | OXYGEN SATURATION: 97 % | RESPIRATION RATE: 16 BRPM | HEART RATE: 69 BPM | DIASTOLIC BLOOD PRESSURE: 88 MMHG

## 2020-03-22 DIAGNOSIS — R05.9 COUGH: ICD-10-CM

## 2020-03-22 DIAGNOSIS — G43.809 OTHER MIGRAINE WITHOUT STATUS MIGRAINOSUS, NOT INTRACTABLE: Primary | ICD-10-CM

## 2020-03-22 PROCEDURE — 96375 TX/PRO/DX INJ NEW DRUG ADDON: CPT

## 2020-03-22 PROCEDURE — 99215 OFFICE O/P EST HI 40 MIN: CPT

## 2020-03-22 PROCEDURE — 71046 X-RAY EXAM CHEST 2 VIEWS: CPT | Performed by: EMERGENCY MEDICINE

## 2020-03-22 PROCEDURE — 99214 OFFICE O/P EST MOD 30 MIN: CPT

## 2020-03-22 PROCEDURE — 96374 THER/PROPH/DIAG INJ IV PUSH: CPT

## 2020-03-22 RX ORDER — KETOROLAC TROMETHAMINE 30 MG/ML
30 INJECTION, SOLUTION INTRAMUSCULAR; INTRAVENOUS ONCE
Status: COMPLETED | OUTPATIENT
Start: 2020-03-22 | End: 2020-03-22

## 2020-03-22 RX ORDER — BENZONATATE 100 MG/1
100 CAPSULE ORAL 3 TIMES DAILY PRN
Qty: 30 CAPSULE | Refills: 0 | Status: SHIPPED | OUTPATIENT
Start: 2020-03-22 | End: 2020-04-21

## 2020-03-22 RX ORDER — DIPHENHYDRAMINE HYDROCHLORIDE 50 MG/ML
25 INJECTION INTRAMUSCULAR; INTRAVENOUS ONCE
Status: COMPLETED | OUTPATIENT
Start: 2020-03-22 | End: 2020-03-22

## 2020-03-22 RX ORDER — ONDANSETRON 4 MG/1
4 TABLET, ORALLY DISINTEGRATING ORAL EVERY 4 HOURS PRN
Qty: 10 TABLET | Refills: 0 | Status: SHIPPED | OUTPATIENT
Start: 2020-03-22 | End: 2020-03-29

## 2020-03-22 RX ORDER — BUTALBITAL, ACETAMINOPHEN AND CAFFEINE 50; 325; 40 MG/1; MG/1; MG/1
1-2 TABLET ORAL EVERY 6 HOURS PRN
Qty: 10 TABLET | Refills: 0 | Status: SHIPPED | OUTPATIENT
Start: 2020-03-22 | End: 2020-03-29

## 2020-03-22 RX ORDER — AZITHROMYCIN 250 MG/1
TABLET, FILM COATED ORAL
Qty: 1 PACKAGE | Refills: 0 | Status: SHIPPED | OUTPATIENT
Start: 2020-03-22 | End: 2020-03-27

## 2020-03-22 RX ORDER — ONDANSETRON 2 MG/ML
4 INJECTION INTRAMUSCULAR; INTRAVENOUS ONCE
Status: COMPLETED | OUTPATIENT
Start: 2020-03-22 | End: 2020-03-22

## 2020-03-22 RX ORDER — KETOROLAC TROMETHAMINE 30 MG/ML
30 INJECTION, SOLUTION INTRAMUSCULAR; INTRAVENOUS ONCE
Status: DISCONTINUED | OUTPATIENT
Start: 2020-03-22 | End: 2020-03-22

## 2020-03-22 RX ORDER — BUTALBITAL, ACETAMINOPHEN AND CAFFEINE 50; 325; 40 MG/1; MG/1; MG/1
1-2 TABLET ORAL EVERY 6 HOURS PRN
Qty: 10 TABLET | Refills: 0 | Status: SHIPPED | OUTPATIENT
Start: 2020-03-22 | End: 2020-03-22

## 2020-03-22 NOTE — ED PROVIDER NOTES
Patient Seen in: [facility]      History   Patient presents with:  Headache  Cough/URI    Stated Complaint: Migrane (asthmatic)    HPI    Migraine to right head, behind right eye  Has anxiety and has had asthmatic symptoms for a 03/22/20 1151 Oral   SpO2 03/22/20 1148 97 %   O2 Device 03/22/20 1148 None (Room air)       Current:/88   Pulse 69   Temp 97.9 °F (36.6 °C)   Resp 16   Ht 170.2 cm (5' 7\")   Wt 74.8 kg   LMP 12/19/2013   SpO2 97%   BMI 25.84 kg/m²         Physical ED Course   Labs Reviewed - No data to display       XR CHEST PA + LAT CHEST (CPT=71046)   Final Result    PROCEDURE:  XR CHEST PA + LAT CHEST (CPT=71046)         INDICATIONS:  Migrane (asthmatic)         COMPARISON:  [facility] , XR, XR CHEST AP P Dispersible  Take 1 tablet (4 mg total) by mouth every 4 (four) hours as needed for Nausea., Normal, Disp-10 tablet, R-0    !! Butalbital-APAP-Caffeine -40 MG Oral Tab  Take 1-2 tablets by mouth every 6 (six) hours as needed for Pain., Print, Disp-10

## 2020-03-22 NOTE — ED INITIAL ASSESSMENT (HPI)
Complains of a headache x 5 days. Patient was seen in [facility] ED 5 days ago and was given treatment for asthma and her headache. Patient states that the headache has not improved.  Patient presents with a cough with speaking and states \" that her asthma is

## 2020-04-08 ENCOUNTER — HOSPITAL ENCOUNTER (OUTPATIENT)
Age: 55
Discharge: HOME | End: 2020-04-08
Payer: MEDICAID

## 2020-04-08 VITALS
RESPIRATION RATE: 20 BRPM | SYSTOLIC BLOOD PRESSURE: 126 MMHG | HEART RATE: 83 BPM | DIASTOLIC BLOOD PRESSURE: 94 MMHG | TEMPERATURE: 98 F | OXYGEN SATURATION: 97 %

## 2020-04-08 DIAGNOSIS — G44.229 CHRONIC TENSION-TYPE HEADACHE, NOT INTRACTABLE: ICD-10-CM

## 2020-04-08 DIAGNOSIS — H60.313 ACUTE DIFFUSE OTITIS EXTERNA OF BOTH EARS: Primary | ICD-10-CM

## 2020-04-08 PROCEDURE — 99214 OFFICE O/P EST MOD 30 MIN: CPT

## 2020-04-08 PROCEDURE — 99213 OFFICE O/P EST LOW 20 MIN: CPT

## 2020-04-08 RX ORDER — NEOMYCIN SULFATE, POLYMYXIN B SULFATE AND HYDROCORTISONE 10; 3.5; 1 MG/ML; MG/ML; [USP'U]/ML
3 SUSPENSION/ DROPS AURICULAR (OTIC) 3 TIMES DAILY
Qty: 1 BOTTLE | Refills: 0 | Status: SHIPPED | OUTPATIENT
Start: 2020-04-08 | End: 2020-04-15

## 2020-04-08 RX ORDER — RIZATRIPTAN BENZOATE 5 MG/1
5 TABLET ORAL AS NEEDED
Qty: 15 TABLET | Refills: 0 | Status: SHIPPED | OUTPATIENT
Start: 2020-04-08 | End: 2020-04-13

## 2020-04-08 NOTE — ED PROVIDER NOTES
Patient Seen in: [facility] In [facility]      History   Patient presents with:  Ear Problem Pain  Headache    Stated Complaint: head ache, double ear infection, cough, [id number]     65-year-old female who presents to the immediate care with com • OTHER      jaw surgery for TMJ   • OTHER SURGICAL HISTORY     • REMOVAL GALLBLADDER     • TOTAL ABDOM HYSTERECTOMY                  Family history reviewed with patient/caregiver and is not pertinent to presenting problem.     Social History    Tobacco has normal speech and gait. MUSCULOSKELETAL: There is no tenderness or deformity. There is no limitation range of motion. There is no evidence of acute injury.     ED Course   Labs Reviewed - No data to display     Advised patient that since she is alrea found. Please discuss with provider.

## 2020-04-08 NOTE — ED INITIAL ASSESSMENT (HPI)
States left ear pain onset 8 days ago  Switched to right ear 4 days ago  Ear pain causing headache and pressure behind eyes.   Nausea from migraine  Took 600 ibuprofen at 9am, 12p and 3pm  Took norco at 9am   Gabapentin at noon

## 2020-07-04 ENCOUNTER — HOSPITAL ENCOUNTER (OUTPATIENT)
Age: 55
Discharge: HOME | End: 2020-07-04
Payer: MEDICAID

## 2020-07-04 ENCOUNTER — APPOINTMENT (OUTPATIENT)
Dept: GENERAL RADIOLOGY | Age: 55
End: 2020-07-04
Attending: PHYSICIAN ASSISTANT
Payer: MEDICAID

## 2020-07-04 VITALS
HEART RATE: 64 BPM | TEMPERATURE: 98 F | OXYGEN SATURATION: 97 % | DIASTOLIC BLOOD PRESSURE: 90 MMHG | SYSTOLIC BLOOD PRESSURE: 131 MMHG | RESPIRATION RATE: 16 BRPM

## 2020-07-04 DIAGNOSIS — M54.2 NECK PAIN: ICD-10-CM

## 2020-07-04 DIAGNOSIS — G43.709 CHRONIC MIGRAINE WITHOUT AURA WITHOUT STATUS MIGRAINOSUS, NOT INTRACTABLE: Primary | ICD-10-CM

## 2020-07-04 PROCEDURE — 99214 OFFICE O/P EST MOD 30 MIN: CPT

## 2020-07-04 PROCEDURE — 72050 X-RAY EXAM NECK SPINE 4/5VWS: CPT | Performed by: PHYSICIAN ASSISTANT

## 2020-07-04 PROCEDURE — 99213 OFFICE O/P EST LOW 20 MIN: CPT

## 2020-07-04 RX ORDER — RIZATRIPTAN BENZOATE 5 MG/1
5 TABLET ORAL AS NEEDED
Qty: 6 TABLET | Refills: 0 | Status: SHIPPED | OUTPATIENT
Start: 2020-07-04 | End: 2020-10-01

## 2020-07-04 RX ORDER — ONDANSETRON 4 MG/1
4 TABLET, ORALLY DISINTEGRATING ORAL EVERY 8 HOURS PRN
Qty: 10 TABLET | Refills: 0 | Status: SHIPPED | OUTPATIENT
Start: 2020-07-04 | End: 2020-07-04

## 2020-07-04 RX ORDER — ONDANSETRON 4 MG/1
4 TABLET, ORALLY DISINTEGRATING ORAL EVERY 8 HOURS PRN
Qty: 10 TABLET | Refills: 0 | Status: SHIPPED | OUTPATIENT
Start: 2020-07-04 | End: 2020-07-11

## 2020-07-04 RX ORDER — ONDANSETRON 4 MG/1
8 TABLET, ORALLY DISINTEGRATING ORAL ONCE
Status: COMPLETED | OUTPATIENT
Start: 2020-07-04 | End: 2020-07-04

## 2020-07-04 NOTE — ED INITIAL ASSESSMENT (HPI)
Pt fell about 1 weeks ago where she injured her ankle/wrist and neck, and she has had a migraine possibly due to her neck injury for 1 week

## 2020-07-04 NOTE — ED PROVIDER NOTES
Patient Seen in: [facility]      History   Patient presents with:  Headache    Stated Complaint: Richardine Francesca X 1 WK    HPI    CHIEF COMPLAINT: Migraine and neck pain     HISTORY OF PRESENT ILLNESS: Patient is a 45-year-old female • Extrinsic asthma, unspecified    • Fibromyalgia    • Fibromyalgia    • IBS               Past Surgical History:   Procedure Laterality Date   • APPENDECTOMY     • APPENDECTOMY     • COCCYX N/A 8/19/2015    Performed by Marcus Ortiz MD at [facility] noted.  No abdominal distention. No pulsatile masses. Neurological:  Grossly intact, no deficits. Cranial nerves are intact, 5 out of 5 symmetric upper and lower extremity motor strength. Gait normal.  Skin:  warm and dry, no rashes. No jaundice.  Brisk requests refill of Maxalt and Fioricet. At this time I will give her prescription for Maxalt and Zofran. No Fioricet was given. Patient is already on an extensive pain regimen through pain management.   I would like the patient to follow-up with neurolog

## 2020-07-08 ENCOUNTER — HOSPITAL ENCOUNTER (OUTPATIENT)
Age: 55
Discharge: HOME | End: 2020-07-08
Payer: MEDICAID

## 2020-07-08 VITALS
OXYGEN SATURATION: 98 % | RESPIRATION RATE: 16 BRPM | HEART RATE: 64 BPM | DIASTOLIC BLOOD PRESSURE: 90 MMHG | TEMPERATURE: 98 F | SYSTOLIC BLOOD PRESSURE: 129 MMHG

## 2020-07-08 DIAGNOSIS — J01.90 ACUTE SINUSITIS, RECURRENCE NOT SPECIFIED, UNSPECIFIED LOCATION: Primary | ICD-10-CM

## 2020-07-08 DIAGNOSIS — J45.21 MILD INTERMITTENT ASTHMA WITH EXACERBATION: ICD-10-CM

## 2020-07-08 DIAGNOSIS — Z20.822 ENCOUNTER FOR LABORATORY TESTING FOR COVID-19 VIRUS: ICD-10-CM

## 2020-07-08 PROCEDURE — 99214 OFFICE O/P EST MOD 30 MIN: CPT

## 2020-07-08 PROCEDURE — 94664 DEMO&/EVAL PT USE INHALER: CPT

## 2020-07-08 PROCEDURE — 99213 OFFICE O/P EST LOW 20 MIN: CPT

## 2020-07-08 RX ORDER — FLUTICASONE PROPIONATE 50 MCG
2 SPRAY, SUSPENSION (ML) NASAL DAILY
Qty: 16 G | Refills: 0 | Status: SHIPPED | OUTPATIENT
Start: 2020-07-08 | End: 2020-08-07

## 2020-07-08 RX ORDER — PREDNISONE 20 MG/1
40 TABLET ORAL DAILY
Qty: 10 TABLET | Refills: 0 | Status: SHIPPED | OUTPATIENT
Start: 2020-07-08 | End: 2020-07-13

## 2020-07-08 RX ORDER — AMOXICILLIN AND CLAVULANATE POTASSIUM 875; 125 MG/1; MG/1
1 TABLET, FILM COATED ORAL 2 TIMES DAILY
Qty: 14 TABLET | Refills: 0 | Status: SHIPPED | OUTPATIENT
Start: 2020-07-08 | End: 2020-07-15

## 2020-07-08 RX ORDER — ALBUTEROL SULFATE 90 UG/1
8 AEROSOL, METERED RESPIRATORY (INHALATION) ONCE
Status: COMPLETED | OUTPATIENT
Start: 2020-07-08 | End: 2020-07-08

## 2020-07-08 NOTE — ED INITIAL ASSESSMENT (HPI)
Sore throat-  X 3 days. Denies feer,  C/o burning in the throat,  With nausea , dizziness.  took pain meds , aleve, tylenol, advil

## 2020-07-08 NOTE — ED PROVIDER NOTES
Patient Seen in: [facility] In [facility]      History   Patient presents with:  Sinus Problem    Stated Complaint: sore throat, congestion    HPI  Patient is 27-year-old female past medical history of asthma, alcohol syndrome, anxiety, esoph other systems reviewed and negative except as noted above.     Physical Exam     ED Triage Vitals   BP 07/08/20 1301 122/84   Pulse 07/08/20 1301 62   Resp 07/08/20 1301 20   Temp 07/08/20 1301 97.9 °F (36.6 °C)   Temp src 07/08/20 1301 Oral   SpO2 07/08/20 distension. Tenderness: There is no tenderness. Lymphadenopathy:      Cervical: No cervical adenopathy. Skin:     General: Skin is warm and dry. Capillary Refill: Capillary refill takes less than 2 seconds. Findings: No rash.    Neurologi Nasal Suspension  2 sprays by Nasal route daily. Qty: 16 g Refills: 0    Amoxicillin-Pot Clavulanate 875-125 MG Oral Tab  Take 1 tablet by mouth 2 (two) times daily for 7 days.   Qty: 14 tablet Refills: 0    predniSONE 20 MG Oral Tab  Take 2 tablets (40 mg

## 2020-07-09 ENCOUNTER — HOSPITAL ENCOUNTER (EMERGENCY)
Facility: HOSPITAL | Age: 55
Discharge: HOME | End: 2020-07-10
Attending: EMERGENCY MEDICINE
Payer: MEDICAID

## 2020-07-09 ENCOUNTER — APPOINTMENT (OUTPATIENT)
Dept: GENERAL RADIOLOGY | Facility: HOSPITAL | Age: 55
End: 2020-07-09
Attending: EMERGENCY MEDICINE
Payer: MEDICAID

## 2020-07-09 DIAGNOSIS — J45.41 MODERATE PERSISTENT ASTHMA WITH EXACERBATION: ICD-10-CM

## 2020-07-09 DIAGNOSIS — G43.501 PERSISTENT MIGRAINE AURA WITHOUT CEREBRAL INFARCTION AND WITH STATUS MIGRAINOSUS, NOT INTRACTABLE: Primary | ICD-10-CM

## 2020-07-09 PROBLEM — R73.9 HYPERGLYCEMIA: Status: ACTIVE | Noted: 2020-07-09

## 2020-07-09 LAB
ALBUMIN SERPL-MCNC: 3.9 G/DL (ref 3.4–5)
ALBUMIN/GLOB SERPL: 0.9 {RATIO} (ref 1–2)
ALP LIVER SERPL-CCNC: 87 U/L (ref 41–108)
ALT SERPL-CCNC: 43 U/L (ref 13–56)
ANION GAP SERPL CALC-SCNC: 2 MMOL/L (ref 0–18)
AST SERPL-CCNC: 35 U/L (ref 15–37)
BASOPHILS # BLD AUTO: 0.02 X10(3) UL (ref 0–0.2)
BASOPHILS NFR BLD AUTO: 0.2 %
BILIRUB SERPL-MCNC: 0.3 MG/DL (ref 0.1–2)
BUN BLD-MCNC: 18 MG/DL (ref 7–18)
BUN/CREAT SERPL: 19.1 (ref 10–20)
CALCIUM BLD-MCNC: 9.2 MG/DL (ref 8.5–10.1)
CHLORIDE SERPL-SCNC: 110 MMOL/L (ref 98–112)
CO2 SERPL-SCNC: 28 MMOL/L (ref 21–32)
CREAT BLD-MCNC: 0.94 MG/DL (ref 0.55–1.02)
DEPRECATED RDW RBC AUTO: 41.6 FL (ref 35.1–46.3)
EOSINOPHIL # BLD AUTO: 0 X10(3) UL (ref 0–0.7)
EOSINOPHIL NFR BLD AUTO: 0 %
ERYTHROCYTE [DISTWIDTH] IN BLOOD BY AUTOMATED COUNT: 12.7 % (ref 11–15)
GLOBULIN PLAS-MCNC: 4.3 G/DL (ref 2.8–4.4)
GLUCOSE BLD-MCNC: 143 MG/DL (ref 70–99)
HCT VFR BLD AUTO: 40 % (ref 35–48)
HGB BLD-MCNC: 12.9 G/DL (ref 12–16)
IMM GRANULOCYTES # BLD AUTO: 0.04 X10(3) UL (ref 0–1)
IMM GRANULOCYTES NFR BLD: 0.4 %
LYMPHOCYTES # BLD AUTO: 0.69 X10(3) UL (ref 1–4)
LYMPHOCYTES NFR BLD AUTO: 6.2 %
M PROTEIN MFR SERPL ELPH: 8.2 G/DL (ref 6.4–8.2)
MCH RBC QN AUTO: 28.9 PG (ref 26–34)
MCHC RBC AUTO-ENTMCNC: 32.3 G/DL (ref 31–37)
MCV RBC AUTO: 89.5 FL (ref 80–100)
MONOCYTES # BLD AUTO: 0.26 X10(3) UL (ref 0.1–1)
MONOCYTES NFR BLD AUTO: 2.3 %
NEUTROPHILS # BLD AUTO: 10.2 X10 (3) UL (ref 1.5–7.7)
NEUTROPHILS # BLD AUTO: 10.2 X10(3) UL (ref 1.5–7.7)
NEUTROPHILS NFR BLD AUTO: 90.9 %
OSMOLALITY SERPL CALC.SUM OF ELEC: 294 MOSM/KG (ref 275–295)
PLATELET # BLD AUTO: 270 10(3)UL (ref 150–450)
POTASSIUM SERPL-SCNC: 4.5 MMOL/L (ref 3.5–5.1)
RBC # BLD AUTO: 4.47 X10(6)UL (ref 3.8–5.3)
SARS-COV-2 RNA RESP QL NAA+PROBE: NOT DETECTED
SODIUM SERPL-SCNC: 140 MMOL/L (ref 136–145)
WBC # BLD AUTO: 11.2 X10(3) UL (ref 4–11)

## 2020-07-09 PROCEDURE — 80053 COMPREHEN METABOLIC PANEL: CPT | Performed by: EMERGENCY MEDICINE

## 2020-07-09 PROCEDURE — 96375 TX/PRO/DX INJ NEW DRUG ADDON: CPT

## 2020-07-09 PROCEDURE — 85025 COMPLETE CBC W/AUTO DIFF WBC: CPT | Performed by: EMERGENCY MEDICINE

## 2020-07-09 PROCEDURE — 99285 EMERGENCY DEPT VISIT HI MDM: CPT

## 2020-07-09 PROCEDURE — 71045 X-RAY EXAM CHEST 1 VIEW: CPT | Performed by: EMERGENCY MEDICINE

## 2020-07-09 PROCEDURE — 99284 EMERGENCY DEPT VISIT MOD MDM: CPT

## 2020-07-09 PROCEDURE — 96366 THER/PROPH/DIAG IV INF ADDON: CPT

## 2020-07-09 PROCEDURE — 96365 THER/PROPH/DIAG IV INF INIT: CPT

## 2020-07-09 RX ORDER — ACETAMINOPHEN AND CODEINE PHOSPHATE 120; 12 MG/5ML; MG/5ML
10 SOLUTION ORAL ONCE
Status: COMPLETED | OUTPATIENT
Start: 2020-07-09 | End: 2020-07-09

## 2020-07-09 RX ORDER — DIPHENHYDRAMINE HYDROCHLORIDE 50 MG/ML
50 INJECTION INTRAMUSCULAR; INTRAVENOUS ONCE
Status: COMPLETED | OUTPATIENT
Start: 2020-07-09 | End: 2020-07-09

## 2020-07-09 RX ORDER — METHYLPREDNISOLONE SODIUM SUCCINATE 125 MG/2ML
125 INJECTION, POWDER, LYOPHILIZED, FOR SOLUTION INTRAMUSCULAR; INTRAVENOUS ONCE
Status: COMPLETED | OUTPATIENT
Start: 2020-07-09 | End: 2020-07-09

## 2020-07-09 RX ORDER — MAGNESIUM SULFATE 1 G/100ML
1 INJECTION INTRAVENOUS ONCE
Status: COMPLETED | OUTPATIENT
Start: 2020-07-09 | End: 2020-07-10

## 2020-07-09 RX ORDER — SODIUM CHLORIDE 9 MG/ML
INJECTION, SOLUTION INTRAVENOUS CONTINUOUS
Status: CANCELLED | OUTPATIENT
Start: 2020-07-09 | End: 2020-07-10

## 2020-07-09 RX ORDER — METOCLOPRAMIDE HYDROCHLORIDE 5 MG/ML
10 INJECTION INTRAMUSCULAR; INTRAVENOUS ONCE
Status: COMPLETED | OUTPATIENT
Start: 2020-07-09 | End: 2020-07-09

## 2020-07-09 RX ORDER — ONDANSETRON 2 MG/ML
4 INJECTION INTRAMUSCULAR; INTRAVENOUS EVERY 4 HOURS PRN
Status: CANCELLED | OUTPATIENT
Start: 2020-07-09

## 2020-07-09 RX ORDER — KETOROLAC TROMETHAMINE 30 MG/ML
30 INJECTION, SOLUTION INTRAMUSCULAR; INTRAVENOUS ONCE
Status: COMPLETED | OUTPATIENT
Start: 2020-07-09 | End: 2020-07-09

## 2020-07-10 VITALS
TEMPERATURE: 98 F | OXYGEN SATURATION: 96 % | BODY MASS INDEX: 25.01 KG/M2 | RESPIRATION RATE: 16 BRPM | DIASTOLIC BLOOD PRESSURE: 89 MMHG | WEIGHT: 165 LBS | HEIGHT: 68 IN | HEART RATE: 64 BPM | SYSTOLIC BLOOD PRESSURE: 140 MMHG

## 2020-07-10 LAB — SARS-COV-2 RNA RESP QL NAA+PROBE: NOT DETECTED

## 2020-07-10 NOTE — ED NOTES
Pt stated \"My pain is a lot better. I do not want to stay, I want to go home. \" md made aware patient

## 2020-07-10 NOTE — ED INITIAL ASSESSMENT (HPI)
Pt reports she is here for difficulty breathing, states she is on an antibiotic for a URI, reports also having migraine and that she is an asthmatic. Has had a cough for 5 days. Reports nauseas and light sensitivity with migraine.

## 2020-07-10 NOTE — ED PROVIDER NOTES
Patient Seen in: [facility] Emergency Department      History   Patient presents with:  Dyspnea GERARD SOB  Headache    Stated Complaint: asthma exacerbation since Monday    HPI  Patient presents to the emergency department with a chief complaint of as HISTORY     • REMOVAL GALLBLADDER     • TOTAL ABDOM HYSTERECTOMY                      Social History    Tobacco Use      Smoking status: Never Smoker      Smokeless tobacco: Never Used    Alcohol use: No    Drug use:  No             Review of Systems    Pos Effort: Pulmonary effort is normal. Tachypnea present. No respiratory distress. Breath sounds: No stridor. Decreased breath sounds present. No wheezing. Comments: Frequent bronchospastic coughing  Chest:      Chest wall: No tenderness. PCR - Normal   CBC WITH DIFFERENTIAL WITH PLATELET    Narrative: The following orders were created for panel order CBC WITH DIFFERENTIAL WITH PLATELET.   Procedure                               Abnormality         Status                     --------- help with the headache as well, Reglan which is not an allergy can be used along with Benadryl, Tylenol with codeine for the cough, Solu-Medrol, Toradol for the headache.   Combination of these medications really should help with the migraine and asthma exa

## 2020-10-01 ENCOUNTER — HOSPITAL ENCOUNTER (OUTPATIENT)
Age: 55
Discharge: HOME | End: 2020-10-01
Attending: EMERGENCY MEDICINE
Payer: MEDICAID

## 2020-10-01 VITALS
SYSTOLIC BLOOD PRESSURE: 137 MMHG | OXYGEN SATURATION: 97 % | RESPIRATION RATE: 16 BRPM | DIASTOLIC BLOOD PRESSURE: 89 MMHG | HEART RATE: 66 BPM | TEMPERATURE: 98 F

## 2020-10-01 DIAGNOSIS — G43.709 CHRONIC MIGRAINE WITHOUT AURA WITHOUT STATUS MIGRAINOSUS, NOT INTRACTABLE: Primary | ICD-10-CM

## 2020-10-01 PROCEDURE — 99214 OFFICE O/P EST MOD 30 MIN: CPT

## 2020-10-01 PROCEDURE — 96361 HYDRATE IV INFUSION ADD-ON: CPT

## 2020-10-01 PROCEDURE — 96374 THER/PROPH/DIAG INJ IV PUSH: CPT

## 2020-10-01 PROCEDURE — 96375 TX/PRO/DX INJ NEW DRUG ADDON: CPT

## 2020-10-01 RX ORDER — RIZATRIPTAN BENZOATE 5 MG/1
5 TABLET ORAL AS NEEDED
Qty: 10 TABLET | Refills: 0 | Status: SHIPPED | OUTPATIENT
Start: 2020-10-01 | End: 2022-01-19

## 2020-10-01 RX ORDER — KETOROLAC TROMETHAMINE 30 MG/ML
30 INJECTION, SOLUTION INTRAMUSCULAR; INTRAVENOUS ONCE
Status: COMPLETED | OUTPATIENT
Start: 2020-10-01 | End: 2020-10-01

## 2020-10-01 RX ORDER — KETOROLAC TROMETHAMINE 30 MG/ML
30 INJECTION, SOLUTION INTRAMUSCULAR; INTRAVENOUS ONCE
Status: DISCONTINUED | OUTPATIENT
Start: 2020-10-01 | End: 2020-10-01

## 2020-10-01 RX ORDER — ONDANSETRON 4 MG/1
4 TABLET, ORALLY DISINTEGRATING ORAL EVERY 4 HOURS PRN
Qty: 10 TABLET | Refills: 0 | Status: SHIPPED | OUTPATIENT
Start: 2020-10-01 | End: 2020-10-08

## 2020-10-01 RX ORDER — ONDANSETRON 2 MG/ML
4 INJECTION INTRAMUSCULAR; INTRAVENOUS ONCE
Status: COMPLETED | OUTPATIENT
Start: 2020-10-01 | End: 2020-10-01

## 2020-10-01 RX ORDER — ONDANSETRON 4 MG/1
4 TABLET, ORALLY DISINTEGRATING ORAL ONCE
Status: DISCONTINUED | OUTPATIENT
Start: 2020-10-01 | End: 2020-10-01

## 2020-10-01 RX ORDER — SODIUM CHLORIDE 9 MG/ML
1000 INJECTION, SOLUTION INTRAVENOUS ONCE
Status: COMPLETED | OUTPATIENT
Start: 2020-10-01 | End: 2020-10-01

## 2020-10-01 NOTE — ED INITIAL ASSESSMENT (HPI)
Pt states had steroid injection in neck 4 days ago. Later that night developed headache - right sided. Remains through today.   Muscles tender since injection  Out of Maxalt, zofran and  Was advised by pain management doctor to make FU appt and go to ED

## 2020-10-01 NOTE — ED PROVIDER NOTES
Patient Seen in: [facility] In [facility]      History   Patient presents with:  Headache    Stated Complaint: since last week monday she received an epidural in the neck and she has had a m*    HPI    65-year-old woman with a significant med reviewed and negative except as noted above.     Physical Exam     ED Triage Vitals   BP 10/01/20 0919 (!) 128/92   Pulse 10/01/20 0917 65   Resp 10/01/20 0917 20   Temp 10/01/20 0917 97.6 °F (36.4 °C)   Temp src 10/01/20 0917 Temporal   SpO2 10/01/20 0917 Effort: Pulmonary effort is normal. No respiratory distress. Breath sounds: Normal breath sounds. No stridor. No wheezing. Chest:      Chest wall: Tenderness present. Abdominal:      General: Bowel sounds are normal. There is no distension.       P hours as needed for Nausea., Normal, Disp-10 tablet, R-0

## 2020-11-24 ENCOUNTER — HOSPITAL ENCOUNTER (OUTPATIENT)
Age: 55
Discharge: HOME | End: 2020-11-24
Payer: MEDICAID

## 2020-11-24 VITALS
TEMPERATURE: 99 F | RESPIRATION RATE: 16 BRPM | DIASTOLIC BLOOD PRESSURE: 71 MMHG | HEIGHT: 68 IN | SYSTOLIC BLOOD PRESSURE: 130 MMHG | WEIGHT: 172 LBS | OXYGEN SATURATION: 96 % | HEART RATE: 87 BPM | BODY MASS INDEX: 26.07 KG/M2

## 2020-11-24 DIAGNOSIS — IMO0002 CHRONIC MIGRAINE: ICD-10-CM

## 2020-11-24 DIAGNOSIS — B37.3 VAGINAL CANDIDIASIS: Primary | ICD-10-CM

## 2020-11-24 PROCEDURE — 87591 N.GONORRHOEAE DNA AMP PROB: CPT | Performed by: PHYSICIAN ASSISTANT

## 2020-11-24 PROCEDURE — 87480 CANDIDA DNA DIR PROBE: CPT | Performed by: PHYSICIAN ASSISTANT

## 2020-11-24 PROCEDURE — 87491 CHLMYD TRACH DNA AMP PROBE: CPT | Performed by: PHYSICIAN ASSISTANT

## 2020-11-24 PROCEDURE — 99214 OFFICE O/P EST MOD 30 MIN: CPT

## 2020-11-24 PROCEDURE — 87510 GARDNER VAG DNA DIR PROBE: CPT | Performed by: PHYSICIAN ASSISTANT

## 2020-11-24 PROCEDURE — 87660 TRICHOMONAS VAGIN DIR PROBE: CPT | Performed by: PHYSICIAN ASSISTANT

## 2020-11-24 RX ORDER — RIZATRIPTAN BENZOATE 5 MG/1
5 TABLET ORAL AS NEEDED
Qty: 5 TABLET | Refills: 0 | Status: SHIPPED | OUTPATIENT
Start: 2020-11-24 | End: 2021-03-21

## 2020-11-24 RX ORDER — FLUCONAZOLE 150 MG/1
150 TABLET ORAL DAILY
Qty: 2 TABLET | Refills: 0 | Status: SHIPPED | OUTPATIENT
Start: 2020-11-24 | End: 2020-11-26

## 2020-11-24 RX ORDER — ONDANSETRON 4 MG/1
4 TABLET, ORALLY DISINTEGRATING ORAL EVERY 4 HOURS PRN
Qty: 10 TABLET | Refills: 0 | Status: SHIPPED | OUTPATIENT
Start: 2020-11-24 | End: 2020-12-01

## 2020-11-24 RX ORDER — NYSTATIN 100000 U/G
1 OINTMENT TOPICAL 2 TIMES DAILY
Qty: 30 G | Refills: 0 | Status: SHIPPED | OUTPATIENT
Start: 2020-11-24 | End: 2022-01-19

## 2020-11-24 NOTE — ED PROVIDER NOTES
Patient Seen in: [facility] [place]      History   Patient presents with:  Eval-G    Stated Complaint: infected surgical site / headache    HPI    53-year-old female who comes in stating that she has been in and out of [facility] for ki 1356]   /71   Pulse 87   Resp 16   Temp 98.5 °F (36.9 °C)   Temp src    SpO2 96 %   O2 Device None (Room air)       Current:/71   Pulse 87   Temp 98.5 °F (36.9 °C)   Resp 16   Ht 172.7 cm (5' 8\")   Wt 78 kg   LMP 12/19/2013   SpO2 96%   BMI 26 today and remains so upon discharge.  I discuss the plan of care with the patient, who expresses understanding.  All questions and concerns are addressed to the patient's satisfaction prior to discharge today.                   Disposition and Plan     Clin

## 2020-11-24 NOTE — ED INITIAL ASSESSMENT (HPI)
Patient states she had urology stent placed at [facility] 11/10 \" but stent fell out \"prior to discharge. Patient returned to [facility] x 2 requiring admission and IV ABX. Was sent home on Cefdinir, Flomax and Soma. Here today c/o red itchy rash vaginal area.

## 2020-12-13 ENCOUNTER — HOSPITAL ENCOUNTER (EMERGENCY)
Facility: HOSPITAL | Age: 55
Discharge: HOME | End: 2020-12-13
Attending: EMERGENCY MEDICINE
Payer: MEDICAID

## 2020-12-13 ENCOUNTER — APPOINTMENT (OUTPATIENT)
Dept: CT IMAGING | Facility: HOSPITAL | Age: 55
End: 2020-12-13
Attending: EMERGENCY MEDICINE
Payer: MEDICAID

## 2020-12-13 VITALS
BODY MASS INDEX: 26 KG/M2 | OXYGEN SATURATION: 99 % | TEMPERATURE: 98 F | DIASTOLIC BLOOD PRESSURE: 106 MMHG | HEART RATE: 86 BPM | RESPIRATION RATE: 22 BRPM | WEIGHT: 172 LBS | SYSTOLIC BLOOD PRESSURE: 153 MMHG

## 2020-12-13 DIAGNOSIS — S39.012A BACK STRAIN, INITIAL ENCOUNTER: Primary | ICD-10-CM

## 2020-12-13 DIAGNOSIS — M79.7 FIBROMYALGIA: ICD-10-CM

## 2020-12-13 PROCEDURE — 80053 COMPREHEN METABOLIC PANEL: CPT | Performed by: EMERGENCY MEDICINE

## 2020-12-13 PROCEDURE — 81003 URINALYSIS AUTO W/O SCOPE: CPT | Performed by: EMERGENCY MEDICINE

## 2020-12-13 PROCEDURE — 96374 THER/PROPH/DIAG INJ IV PUSH: CPT

## 2020-12-13 PROCEDURE — 96361 HYDRATE IV INFUSION ADD-ON: CPT

## 2020-12-13 PROCEDURE — 85025 COMPLETE CBC W/AUTO DIFF WBC: CPT | Performed by: EMERGENCY MEDICINE

## 2020-12-13 PROCEDURE — 96375 TX/PRO/DX INJ NEW DRUG ADDON: CPT

## 2020-12-13 PROCEDURE — 99284 EMERGENCY DEPT VISIT MOD MDM: CPT

## 2020-12-13 PROCEDURE — 74176 CT ABD & PELVIS W/O CONTRAST: CPT | Performed by: EMERGENCY MEDICINE

## 2020-12-13 RX ORDER — CYCLOBENZAPRINE HCL 10 MG
10 TABLET ORAL 3 TIMES DAILY PRN
Qty: 20 TABLET | Refills: 0 | Status: SHIPPED | OUTPATIENT
Start: 2020-12-13 | End: 2020-12-20

## 2020-12-13 RX ORDER — KETOROLAC TROMETHAMINE 30 MG/ML
15 INJECTION, SOLUTION INTRAMUSCULAR; INTRAVENOUS ONCE
Status: COMPLETED | OUTPATIENT
Start: 2020-12-13 | End: 2020-12-13

## 2020-12-13 RX ORDER — IBUPROFEN 600 MG/1
600 TABLET ORAL EVERY 8 HOURS PRN
Qty: 30 TABLET | Refills: 0 | Status: SHIPPED | OUTPATIENT
Start: 2020-12-13 | End: 2020-12-23

## 2020-12-13 RX ORDER — ONDANSETRON 2 MG/ML
4 INJECTION INTRAMUSCULAR; INTRAVENOUS ONCE
Status: COMPLETED | OUTPATIENT
Start: 2020-12-13 | End: 2020-12-13

## 2020-12-13 NOTE — ED PROVIDER NOTES
Patient Seen in: [facility] Emergency Department      History   Patient presents with:  Nausea/Vomiting/Diarrhea  Back Pain    Stated Complaint: NVD for past week, lower back pain x 2 weeks    HPI    17-year-old female presents emergency department (35.8 °C)   Temp src Temporal   SpO2 96 %   O2 Device None (Room air)       Current:BP (!) 153/106   Pulse 86   Temp 98.1 °F (36.7 °C) (Temporal)   Resp 22   Wt 78 kg   LMP 12/19/2013   SpO2 99%   BMI 26.15 kg/m²         Physical Exam    All measures to pr PLATELET.   Procedure                               Abnormality         Status                     ---------                               -----------         ------                     CBC W/ DIFFERENTIAL[[id number]]                              Final resul stomach, duodenum sweep and small bowel are unremarkable. Patient is status post appendectomy with surgical clips in the right lower quadrant identified.   The descending and sigmoid colon appear with a slightly thickened wall, particularly the sigmoid col condition.                            Disposition and Plan     Clinical Impression:  Back strain, initial encounter  (primary encounter diagnosis)  Fibromyalgia    Disposition:  Discharge  12/13/2020  5:21 pm    Follow-up:  Lola Finch

## 2020-12-13 NOTE — ED INITIAL ASSESSMENT (HPI)
Pt here with c/o lt lower back pain that started a few weeks ago. Pt c/o n/v because of it and also states that she is having migraines because she's not eating. Pt denies fever, c/o dysuria.

## 2021-03-21 ENCOUNTER — HOSPITAL ENCOUNTER (OUTPATIENT)
Age: 56
Discharge: HOME | End: 2021-03-21
Attending: EMERGENCY MEDICINE
Payer: MEDICAID

## 2021-03-21 VITALS
TEMPERATURE: 97 F | DIASTOLIC BLOOD PRESSURE: 99 MMHG | SYSTOLIC BLOOD PRESSURE: 139 MMHG | WEIGHT: 155 LBS | OXYGEN SATURATION: 98 % | RESPIRATION RATE: 18 BRPM | HEART RATE: 78 BPM | HEIGHT: 67 IN | BODY MASS INDEX: 24.33 KG/M2

## 2021-03-21 DIAGNOSIS — G43.809 OTHER MIGRAINE WITHOUT STATUS MIGRAINOSUS, NOT INTRACTABLE: Primary | ICD-10-CM

## 2021-03-21 PROCEDURE — 99213 OFFICE O/P EST LOW 20 MIN: CPT

## 2021-03-21 RX ORDER — TIZANIDINE 4 MG/1
4 TABLET ORAL NIGHTLY
COMMUNITY
Start: 2021-03-12 | End: 2022-01-19

## 2021-03-21 RX ORDER — ZOLPIDEM TARTRATE 10 MG/1
10 TABLET ORAL NIGHTLY PRN
COMMUNITY
End: 2021-09-13

## 2021-03-21 RX ORDER — ONDANSETRON 4 MG/1
4 TABLET, ORALLY DISINTEGRATING ORAL EVERY 4 HOURS PRN
Qty: 10 TABLET | Refills: 0 | Status: SHIPPED | OUTPATIENT
Start: 2021-03-21 | End: 2021-03-28

## 2021-03-21 RX ORDER — RIZATRIPTAN BENZOATE 5 MG/1
10 TABLET ORAL AS NEEDED
Qty: 10 TABLET | Refills: 0 | Status: SHIPPED | OUTPATIENT
Start: 2021-03-21 | End: 2022-01-19

## 2021-03-21 NOTE — ED INITIAL ASSESSMENT (HPI)
Pt HAS HAD 3 DAYS OF MIGRAINE, THAT GOT PROGRESSIVELY WORSE.  She ran out of maxalt, and has also been nauseated with vomiting and photosensitivity

## 2021-03-21 NOTE — ED PROVIDER NOTES
Patient Seen in: [facility]      History   Patient presents with:  Headache    Stated Complaint: magrines x 3 days    HPI/Subjective:   HPI    61-year-old female history of chronic migraines, anxiety, fibromyalgia, irritable bowel syndrom Use: Never used    Alcohol use: No    Drug use: No             Review of Systems    Positive for stated complaint: magrines x 3 days  Other systems are as noted in HPI. Constitutional and vital signs reviewed.       All other systems reviewed and negative and go home and rest.  Given prescription for Maxalt, she states that she needs to the higher dose or if she takes a lower dose it will not work.   Given Maxalt 10 mg tablets with instructions to take 1 tablet and if not improved she can wait 2 hours and ta

## 2021-09-13 ENCOUNTER — APPOINTMENT (OUTPATIENT)
Dept: MRI IMAGING | Age: 56
End: 2021-09-13
Attending: PHYSICIAN ASSISTANT
Payer: MEDICAID

## 2021-09-13 ENCOUNTER — HOSPITAL ENCOUNTER (EMERGENCY)
Age: 56
Discharge: HOME | End: 2021-09-13
Attending: EMERGENCY MEDICINE
Payer: MEDICAID

## 2021-09-13 VITALS
WEIGHT: 165 LBS | SYSTOLIC BLOOD PRESSURE: 156 MMHG | BODY MASS INDEX: 25.9 KG/M2 | HEART RATE: 77 BPM | RESPIRATION RATE: 18 BRPM | HEIGHT: 67 IN | TEMPERATURE: 97 F | DIASTOLIC BLOOD PRESSURE: 100 MMHG | OXYGEN SATURATION: 96 %

## 2021-09-13 DIAGNOSIS — J39.2 THROAT IRRITATION: ICD-10-CM

## 2021-09-13 DIAGNOSIS — M54.9 BACK PAIN WITHOUT RADICULOPATHY: ICD-10-CM

## 2021-09-13 DIAGNOSIS — S39.012A LUMBAR STRAIN, INITIAL ENCOUNTER: Primary | ICD-10-CM

## 2021-09-13 LAB
ALBUMIN SERPL-MCNC: 3.5 G/DL (ref 3.4–5)
ALBUMIN/GLOB SERPL: 0.9 {RATIO} (ref 1–2)
ALP LIVER SERPL-CCNC: 93 U/L
ALT SERPL-CCNC: 51 U/L
ANION GAP SERPL CALC-SCNC: 4 MMOL/L (ref 0–18)
AST SERPL-CCNC: 35 U/L (ref 15–37)
BASOPHILS # BLD AUTO: 0.04 X10(3) UL (ref 0–0.2)
BASOPHILS NFR BLD AUTO: 0.8 %
BILIRUB SERPL-MCNC: 0.4 MG/DL (ref 0.1–2)
BILIRUB UR QL STRIP.AUTO: NEGATIVE
BUN BLD-MCNC: 14 MG/DL (ref 7–18)
CALCIUM BLD-MCNC: 8.8 MG/DL (ref 8.5–10.1)
CHLORIDE SERPL-SCNC: 112 MMOL/L (ref 98–112)
CLARITY UR REFRACT.AUTO: CLEAR
CO2 SERPL-SCNC: 24 MMOL/L (ref 21–32)
COLOR UR AUTO: YELLOW
CREAT BLD-MCNC: 0.68 MG/DL
EOSINOPHIL # BLD AUTO: 0.1 X10(3) UL (ref 0–0.7)
EOSINOPHIL NFR BLD AUTO: 2.1 %
ERYTHROCYTE [DISTWIDTH] IN BLOOD BY AUTOMATED COUNT: 12.5 %
GLOBULIN PLAS-MCNC: 3.8 G/DL (ref 2.8–4.4)
GLUCOSE BLD-MCNC: 80 MG/DL (ref 70–99)
GLUCOSE UR STRIP.AUTO-MCNC: NEGATIVE MG/DL
HCT VFR BLD AUTO: 37.6 %
HGB BLD-MCNC: 12.5 G/DL
IMM GRANULOCYTES # BLD AUTO: 0.01 X10(3) UL (ref 0–1)
IMM GRANULOCYTES NFR BLD: 0.2 %
KETONES UR STRIP.AUTO-MCNC: NEGATIVE MG/DL
LEUKOCYTE ESTERASE UR QL STRIP.AUTO: NEGATIVE
LYMPHOCYTES # BLD AUTO: 2.16 X10(3) UL (ref 1–4)
LYMPHOCYTES NFR BLD AUTO: 45.6 %
M PROTEIN MFR SERPL ELPH: 7.3 G/DL (ref 6.4–8.2)
MCH RBC QN AUTO: 29.8 PG (ref 26–34)
MCHC RBC AUTO-ENTMCNC: 33.2 G/DL (ref 31–37)
MCV RBC AUTO: 89.5 FL
MONOCYTES # BLD AUTO: 0.41 X10(3) UL (ref 0.1–1)
MONOCYTES NFR BLD AUTO: 8.6 %
NEUTROPHILS # BLD AUTO: 2.02 X10 (3) UL (ref 1.5–7.7)
NEUTROPHILS # BLD AUTO: 2.02 X10(3) UL (ref 1.5–7.7)
NEUTROPHILS NFR BLD AUTO: 42.7 %
NITRITE UR QL STRIP.AUTO: NEGATIVE
OSMOLALITY SERPL CALC.SUM OF ELEC: 289 MOSM/KG (ref 275–295)
PH UR STRIP.AUTO: 5.5 [PH] (ref 5–8)
PLATELET # BLD AUTO: 263 10(3)UL (ref 150–450)
POTASSIUM SERPL-SCNC: 3.9 MMOL/L (ref 3.5–5.1)
PROT UR STRIP.AUTO-MCNC: NEGATIVE MG/DL
RBC # BLD AUTO: 4.2 X10(6)UL
RBC UR QL AUTO: NEGATIVE
SODIUM SERPL-SCNC: 140 MMOL/L (ref 136–145)
SP GR UR STRIP.AUTO: >=1.03 (ref 1–1.03)
TSI SER-ACNC: 2.4 MIU/ML (ref 0.36–3.74)
UROBILINOGEN UR STRIP.AUTO-MCNC: 0.2 MG/DL
WBC # BLD AUTO: 4.7 X10(3) UL (ref 4–11)

## 2021-09-13 PROCEDURE — 99284 EMERGENCY DEPT VISIT MOD MDM: CPT

## 2021-09-13 PROCEDURE — 80053 COMPREHEN METABOLIC PANEL: CPT | Performed by: PHYSICIAN ASSISTANT

## 2021-09-13 PROCEDURE — 72148 MRI LUMBAR SPINE W/O DYE: CPT | Performed by: PHYSICIAN ASSISTANT

## 2021-09-13 PROCEDURE — 36415 COLL VENOUS BLD VENIPUNCTURE: CPT

## 2021-09-13 PROCEDURE — 84443 ASSAY THYROID STIM HORMONE: CPT | Performed by: PHYSICIAN ASSISTANT

## 2021-09-13 PROCEDURE — 85025 COMPLETE CBC W/AUTO DIFF WBC: CPT | Performed by: PHYSICIAN ASSISTANT

## 2021-09-13 PROCEDURE — 81003 URINALYSIS AUTO W/O SCOPE: CPT | Performed by: EMERGENCY MEDICINE

## 2021-09-13 PROCEDURE — 81003 URINALYSIS AUTO W/O SCOPE: CPT

## 2021-09-13 RX ORDER — CYCLOBENZAPRINE HCL 10 MG
10 TABLET ORAL 3 TIMES DAILY PRN
Qty: 20 TABLET | Refills: 0 | Status: SHIPPED | OUTPATIENT
Start: 2021-09-13 | End: 2021-09-20

## 2021-09-13 RX ORDER — LIDOCAINE 4 G/G
2 PATCH TOPICAL EVERY 24 HOURS
Qty: 14 PATCH | Refills: 0 | Status: SHIPPED | OUTPATIENT
Start: 2021-09-13 | End: 2021-09-20

## 2021-09-13 RX ORDER — PREDNISONE 20 MG/1
60 TABLET ORAL DAILY
Qty: 15 TABLET | Refills: 0 | Status: SHIPPED | OUTPATIENT
Start: 2021-09-13 | End: 2021-09-18

## 2021-09-13 NOTE — ED INITIAL ASSESSMENT (HPI)
Pt states for 2 weeks sore throat and feels like she having difficulty breathing when lying down.  Also co lower back pain for 2 weeks saw her PMD on took medrol dose pack  Also urinary urgency

## 2021-09-13 NOTE — ED QUICK NOTES
Pt was not in the room for discharge teaching or to be able to speak with the doctor, this patient left prior to discharge.

## 2021-09-13 NOTE — ED PROVIDER NOTES
Patient Seen in: THE [facility] In [place]      History   Patient presents with:  Sore Throat  Back Pain    Stated Complaint: sore throat feels glands swollen x2 weeks     Subjective:   HPI    61-year-old female.   Medical history of chron Procedure: COCCYX;  Surgeon: Jassi Almeida MD;  Location: [facility] FOR PAIN MANAGEMENT   • FLUOROSCOPIC GUIDANCE NEEDLE PLACEMENT N/A 8/19/2015    Procedure: COCCYX;  Surgeon: Jassi Almeida MD;  Location: [address] [place] [address] No stridor to auscultation  Lung: No distress, RR, no retraction, breath sounds are clear bilaterally  Cardio: Regular rate and rhythm, normal S1-S2, no murmur appreciable  Back: Generalized tenderness, right greater than left.   This extends into the late disc/facet abnormality, spinal stenosis, or foraminal narrowing. L2-L3:  No significant disc/facet abnormality, spinal stenosis, or foraminal narrowing. L3-L4:  There is mild disc desiccation, mild disc height loss and annular disc bulge.   Mild bilateral f changes, most pronounced at L3-4 where there is desiccation, disc height loss and mild annular disc bulge. There is secondary mild bilateral subarticular stenosis without central canal or neural foraminal stenosis.  3. Normal appearance of the conus and ca

## 2021-09-14 NOTE — ED NOTES
I reviewed that chart and discussed the case. I have examined the patient and noted the patient states that she is been feeling like her anterior throat. Feeling like the throat is a little swollen.   She says when she is lies down she feels like it is a nerves are grossly intact. .  Extraocular muscles are intact. Muscle strength is 5 out of 5. Sensory exam is normal.    The patient is otherwise neurovascularly intact.       Because of the loss of urine or bowel a MRI was ordered      MRI SPINE LUMBAR ( neural foraminal stenosis. 3. Normal appearance of the conus and cauda equina.    Dictated by (CST): Hailee Milner DO on 9/13/2021 at 3:53 PM     Finalized by (CST): Hailee Milner DO on 9/13/2021 at 3:57 PM     The patient did get her thyroid functions that

## 2021-10-28 ENCOUNTER — HOSPITAL ENCOUNTER (OUTPATIENT)
Age: 56
Discharge: ANOTHER HOSPITAL | End: 2021-10-28
Attending: EMERGENCY MEDICINE
Payer: MEDICAID

## 2021-10-28 VITALS
TEMPERATURE: 97 F | SYSTOLIC BLOOD PRESSURE: 134 MMHG | RESPIRATION RATE: 20 BRPM | OXYGEN SATURATION: 98 % | HEART RATE: 95 BPM | DIASTOLIC BLOOD PRESSURE: 93 MMHG

## 2021-10-28 DIAGNOSIS — Z92.241 STATUS POST EPIDURAL STEROID INJECTION: ICD-10-CM

## 2021-10-28 DIAGNOSIS — R32 URINARY INCONTINENCE, UNSPECIFIED TYPE: Primary | ICD-10-CM

## 2021-10-28 DIAGNOSIS — R20.0 LEG NUMBNESS: ICD-10-CM

## 2021-10-28 PROCEDURE — 81002 URINALYSIS NONAUTO W/O SCOPE: CPT | Performed by: EMERGENCY MEDICINE

## 2021-10-28 PROCEDURE — 99215 OFFICE O/P EST HI 40 MIN: CPT

## 2021-10-28 PROCEDURE — 99213 OFFICE O/P EST LOW 20 MIN: CPT

## 2021-10-28 NOTE — ED PROVIDER NOTES
Patient Seen in: [facility]      History   Patient presents with:  Postop/Procedure Problem    Stated Complaint: Neck, Bladder and Bowel problem    Subjective:   HPI    Patient presents with urinary incontinence and dysuria.   The patient Surgeon: Jazmin Shay MD;  Location: [address] [place] [address] FOR PAIN MANAGEMENT   • HYSTERECTOMY     • M-SEDAJ BY  PHYS [id number] [facility] 61  [address] [facility] 5+ YR N/A 1/19/2015    Procedure: COCCYX;  Surgeon: Jazmin Shay MD;  Location: [address] [place] [address] FOR PAIN MANAGEMENT   • M-SEDAJ BY  lower extremity and right foot per patient.     ED Course     Labs Reviewed   POCT URINALYSIS DIPSTICK - Abnormal; Notable for the following components:       Result Value    Leukocyte esterase urine Trace (*)     All other components within normal limits

## 2021-10-28 NOTE — ED INITIAL ASSESSMENT (HPI)
Pt had cervical steroid inj  4 levels from from Dr Jony Isaacs. Pt states she she voided suddenly while on her way to the bathroom  She states  It happened about 3-4 x. Today she has not been drinking fluids. Tried to call her doctor but never got a call back.

## 2021-12-11 ENCOUNTER — HOSPITAL ENCOUNTER (OUTPATIENT)
Age: 56
Discharge: HOME | End: 2021-12-11
Payer: MEDICAID

## 2021-12-11 VITALS
WEIGHT: 155 LBS | BODY MASS INDEX: 23.49 KG/M2 | OXYGEN SATURATION: 98 % | TEMPERATURE: 98 F | RESPIRATION RATE: 20 BRPM | DIASTOLIC BLOOD PRESSURE: 100 MMHG | HEART RATE: 76 BPM | SYSTOLIC BLOOD PRESSURE: 144 MMHG | HEIGHT: 68 IN

## 2021-12-11 DIAGNOSIS — L03.011 PARONYCHIA OF THUMB, RIGHT: Primary | ICD-10-CM

## 2021-12-11 PROCEDURE — 99213 OFFICE O/P EST LOW 20 MIN: CPT

## 2021-12-11 RX ORDER — CEFADROXIL 500 MG/1
500 CAPSULE ORAL 2 TIMES DAILY
Qty: 14 CAPSULE | Refills: 0 | Status: SHIPPED | OUTPATIENT
Start: 2021-12-11 | End: 2021-12-18

## 2021-12-11 NOTE — ED PROVIDER NOTES
Patient Seen in: [facility]      History   Patient presents with:  Finger Pain    Stated Complaint: hand infection / wound on finger     Subjective:   HPI    CHIEF COMPLAINT: Infection on the right thumb, cut on the right third digit Surgical History:   Procedure Laterality Date   • APPENDECTOMY     • APPENDECTOMY     • DRAIN/INJECT MEDIUM JOINT/BURSA N/A 1/19/2015    Procedure: COCCYX;  Surgeon: Saloni Smalls MD;  Location: [address] [place] [address]   • DRAIN/INJECT MEDIUM Gaby Mon Exam    Vital signs and nursing notes reviewed  General Appearance: No acute distress  Neurological:  A&Ox3,  Gait normal.  Psychiatric: calm and cooperative  Respiratory: Normal effort  Musculoskeletal: Extremities are symmetrical, full range of motion  S Cap  Take 1 capsule (500 mg total) by mouth 2 (two) times daily for 7 days. , Normal, Disp-14 capsule, R-0

## 2021-12-11 NOTE — ED INITIAL ASSESSMENT (HPI)
C/o right 1st and 3rd finger pain, 3rd finger slammed to a door yesterday and 1st finger 3 days ago trimmed the nail-now with redness and swelling.

## 2022-01-18 ENCOUNTER — APPOINTMENT (OUTPATIENT)
Dept: CT IMAGING | Age: 57
End: 2022-01-18
Attending: EMERGENCY MEDICINE
Payer: MEDICAID

## 2022-01-18 ENCOUNTER — HOSPITAL ENCOUNTER (OUTPATIENT)
Age: 57
Discharge: HOME | End: 2022-01-18
Attending: EMERGENCY MEDICINE
Payer: MEDICAID

## 2022-01-18 VITALS
SYSTOLIC BLOOD PRESSURE: 125 MMHG | WEIGHT: 150 LBS | TEMPERATURE: 98 F | RESPIRATION RATE: 18 BRPM | DIASTOLIC BLOOD PRESSURE: 86 MMHG | OXYGEN SATURATION: 97 % | BODY MASS INDEX: 23 KG/M2 | HEART RATE: 75 BPM

## 2022-01-18 DIAGNOSIS — R19.7 NAUSEA VOMITING AND DIARRHEA: Primary | ICD-10-CM

## 2022-01-18 DIAGNOSIS — R11.2 NAUSEA VOMITING AND DIARRHEA: Primary | ICD-10-CM

## 2022-01-18 LAB
#MXD IC: 0.5 X10ˆ3/UL (ref 0.1–1)
BUN BLD-MCNC: 15 MG/DL (ref 7–18)
CHLORIDE BLD-SCNC: 105 MMOL/L (ref 98–112)
CO2 BLD-SCNC: 24 MMOL/L (ref 21–32)
CREAT BLD-MCNC: 0.7 MG/DL
GLUCOSE BLD-MCNC: 92 MG/DL (ref 70–99)
HCT VFR BLD AUTO: 42.3 %
HCT VFR BLD CALC: 44 %
HGB BLD-MCNC: 13.8 G/DL
ISTAT IONIZED CALCIUM FOR CHEM 8: 1.27 MMOL/L (ref 1.12–1.32)
LYMPHOCYTES # BLD AUTO: 2 X10ˆ3/UL (ref 1–4)
LYMPHOCYTES NFR BLD AUTO: 34.3 %
MCH RBC QN AUTO: 29 PG (ref 26–34)
MCHC RBC AUTO-ENTMCNC: 32.6 G/DL (ref 31–37)
MCV RBC AUTO: 88.9 FL (ref 80–100)
MIXED CELL %: 7.9 %
NEUTROPHILS # BLD AUTO: 3.3 X10ˆ3/UL (ref 1.5–7.7)
NEUTROPHILS NFR BLD AUTO: 57.8 %
PLATELET # BLD AUTO: 301 X10ˆ3/UL (ref 150–450)
POCT GLUCOSE URINE: NEGATIVE MG/DL
POCT INFLUENZA A: NEGATIVE
POCT INFLUENZA B: NEGATIVE
POCT KETONE URINE: NEGATIVE MG/DL
POCT LEUKOCYTE ESTERASE URINE: NEGATIVE
POCT NITRITE URINE: NEGATIVE
POCT PH URINE: 5.5 (ref 5–8)
POCT PROTEIN URINE: 30 MG/DL
POCT SPECIFIC GRAVITY URINE: 1.03
POCT URINE CLARITY: CLEAR
POCT UROBILINOGEN URINE: 0.2 MG/DL
POTASSIUM BLD-SCNC: 4 MMOL/L (ref 3.6–5.1)
RBC # BLD AUTO: 4.76 X10ˆ6/UL
SODIUM BLD-SCNC: 140 MMOL/L (ref 136–145)
WBC # BLD AUTO: 5.8 X10ˆ3/UL (ref 4–11)

## 2022-01-18 PROCEDURE — 99214 OFFICE O/P EST MOD 30 MIN: CPT

## 2022-01-18 PROCEDURE — 99215 OFFICE O/P EST HI 40 MIN: CPT

## 2022-01-18 PROCEDURE — 87502 INFLUENZA DNA AMP PROBE: CPT | Performed by: EMERGENCY MEDICINE

## 2022-01-18 PROCEDURE — 96374 THER/PROPH/DIAG INJ IV PUSH: CPT

## 2022-01-18 PROCEDURE — 81002 URINALYSIS NONAUTO W/O SCOPE: CPT | Performed by: EMERGENCY MEDICINE

## 2022-01-18 PROCEDURE — 80047 BASIC METABLC PNL IONIZED CA: CPT

## 2022-01-18 PROCEDURE — 74177 CT ABD & PELVIS W/CONTRAST: CPT | Performed by: EMERGENCY MEDICINE

## 2022-01-18 PROCEDURE — 96375 TX/PRO/DX INJ NEW DRUG ADDON: CPT

## 2022-01-18 PROCEDURE — 85025 COMPLETE CBC W/AUTO DIFF WBC: CPT | Performed by: EMERGENCY MEDICINE

## 2022-01-18 PROCEDURE — 96361 HYDRATE IV INFUSION ADD-ON: CPT

## 2022-01-18 RX ORDER — SODIUM CHLORIDE 9 MG/ML
1000 INJECTION, SOLUTION INTRAVENOUS ONCE
Status: COMPLETED | OUTPATIENT
Start: 2022-01-18 | End: 2022-01-18

## 2022-01-18 RX ORDER — ONDANSETRON 2 MG/ML
4 INJECTION INTRAMUSCULAR; INTRAVENOUS ONCE
Status: COMPLETED | OUTPATIENT
Start: 2022-01-18 | End: 2022-01-18

## 2022-01-18 RX ORDER — DICYCLOMINE HCL 20 MG
20 TABLET ORAL 4 TIMES DAILY PRN
Qty: 30 TABLET | Refills: 0 | Status: SHIPPED | OUTPATIENT
Start: 2022-01-18 | End: 2022-02-17

## 2022-01-18 RX ORDER — ONDANSETRON 4 MG/1
4 TABLET, ORALLY DISINTEGRATING ORAL EVERY 4 HOURS PRN
Qty: 10 TABLET | Refills: 0 | Status: SHIPPED | OUTPATIENT
Start: 2022-01-18 | End: 2022-01-25

## 2022-01-18 RX ORDER — KETOROLAC TROMETHAMINE 30 MG/ML
30 INJECTION, SOLUTION INTRAMUSCULAR; INTRAVENOUS ONCE
Status: COMPLETED | OUTPATIENT
Start: 2022-01-18 | End: 2022-01-18

## 2022-01-18 NOTE — ED INITIAL ASSESSMENT (HPI)
Patient presents to IC with c/o nausea,vomiting,diarrhea x 2 days. +Abdominal pain with walking.h/o IBS. h/o Covid 12/28/21 + covid-hospitalized [facility]-home w/ O2

## 2022-01-19 ENCOUNTER — OFFICE VISIT (OUTPATIENT)
Dept: UROLOGY | Facility: HOSPITAL | Age: 57
End: 2022-01-19
Attending: OBSTETRICS & GYNECOLOGY
Payer: MEDICAID

## 2022-01-19 ENCOUNTER — LAB REQUISITION (OUTPATIENT)
Dept: LAB | Facility: HOSPITAL | Age: 57
End: 2022-01-19
Payer: MEDICAID

## 2022-01-19 VITALS — HEIGHT: 68 IN | TEMPERATURE: 98 F | BODY MASS INDEX: 22.73 KG/M2 | WEIGHT: 150 LBS

## 2022-01-19 DIAGNOSIS — R32 UNSPECIFIED URINARY INCONTINENCE: ICD-10-CM

## 2022-01-19 DIAGNOSIS — N94.10 DYSPAREUNIA, FEMALE: ICD-10-CM

## 2022-01-19 DIAGNOSIS — N39.3 STRESS INCONTINENCE OF URINE: Primary | ICD-10-CM

## 2022-01-19 DIAGNOSIS — N39.41 URGE INCONTINENCE: ICD-10-CM

## 2022-01-19 DIAGNOSIS — K59.00 CONSTIPATION, UNSPECIFIED CONSTIPATION TYPE: ICD-10-CM

## 2022-01-19 DIAGNOSIS — R10.2 PELVIC PAIN: ICD-10-CM

## 2022-01-19 PROCEDURE — 99212 OFFICE O/P EST SF 10 MIN: CPT

## 2022-01-19 PROCEDURE — 87086 URINE CULTURE/COLONY COUNT: CPT | Performed by: OBSTETRICS & GYNECOLOGY

## 2022-01-19 NOTE — PROGRESS NOTES
ID: Ashley Montes  : 1965  Date: 2022     Referred by Dr. Cam Marsh    No chief complaint on file.       HPI:  The patient is a 56 year-old female, G***P***, who presents ***        Urogynecology Summary:  Urogynecology Summary  Prolapse REMOVAL GALLBLADDER     • TOTAL ABDOM HYSTERECTOMY        Family History   Problem Relation Age of Onset   • Heart Disease Maternal Grandmother    • Heart Disease Sister    • Cancer Neg    • Stroke Neg       Social History    Tobacco Use      Smoking statu topically 2 (two) times daily. , Disp: 30 g, Rfl: 0  BUPROPION HCL OR, Take 300 mg by mouth.    (Patient not taking: No sig reported), Disp: , Rfl:   Rizatriptan Benzoate (MAXALT) 5 MG Oral Tab, Take 1 tablet (5 mg total) by mouth as needed for Migraine (5 m and without nodules. No lymphadenopathy or masses palpated. LUNGS:  Normal respiratory effort. ABDOMEN: Non tender to palpation, tone normal without rigidity or guarding, no masses present, no evidence of hernia.    EXTREMITIES:  Without edema, varico

## 2022-01-19 NOTE — ED PROVIDER NOTES
Patient Seen in: [facility]      History   Patient presents with:  Nausea/Vomiting/Diarrhea  Abdominal Pain    Stated Complaint: Cov Positive 12/30/21 Cough SOB, hx asthma    Subjective:   HPI    Patient is a 30-year-old woman with a hist 8/19/2015    Procedure: COCCYX;  Surgeon: Ousmane Love MD;  Location: [facility] FOR PAIN MANAGEMENT   • OTHER      bladder sling   • OTHER      jaw surgery for TMJ   • OTHER SURGICAL HISTORY     • REMOVAL GALLBLADDER     • TOTAL ABDOM HYSTERECTOMY oriented to person, place, and time. Motor: No abnormal muscle tone.       Coordination: Coordination normal.   Psychiatric:         Behavior: Behavior normal.              ED Course     Labs Reviewed   POCT URINALYSIS DIPSTICK - Abnormal; Notable for transcribed by Technologist)  Patient complains     of abdominal pain, cough shortness of breath. Patient states she Had Covid     12/30/2021          CONTRAST USED:  100cc of Omnipaque 350         FINDINGS:      LIVER:  Uniform parenchyma.     BILIARY:  Hope benign. Patient is well-appearing nontoxic demonstrates understand she is stable for discharge home.                            Disposition and Plan     Clinical Impression:  Nausea vomiting and diarrhea  (primary encounter diagnosis)     Disposition:  Dis

## 2022-01-19 NOTE — PROGRESS NOTES
ID: Nirali Whipple  : 1965  Date: 2022       Patient presents with: Incontinence: Self referred. [facility] paperwork. UUI and ARIEL.  Pelvis pain      HPI:  The patient is a 49year-old female,  (vaginal deliveries), wh Pain          HISTORY:  Past Medical History:   Diagnosis Date   • Anxiety    • Anxiety state, unspecified    • Back pain    • Depression    • Esophageal reflux    • Extrinsic asthma, unspecified    • Fibromyalgia    • Fibromyalgia    • IBS    • Kidney sto COMMENTS)    Comment:Dystonia- not a problem if given with Benadryl    Medications:  Butalbital-APAP-Caffeine -40 MG Oral Tab, Take 1 tablet by mouth every 6 (six) hours as needed for Pain., Disp: 10 tablet, Rfl: 0  escitalopram (LEXAPRO) 20 MG Oral 25 ampule, Rfl: 0  ALPRAZolam 0.25 MG Oral Tab, Take 0.25 mg by mouth nightly as needed.  (Patient not taking: No sig reported), Disp: , Rfl:   Albuterol Sulfate  (90 Base) MCG/ACT Inhalation Aero Soln, Inhale 1 puff into the lungs every 6 (six) hour exposure or tenderness. Cervix and uterus: surgically absent  Adnexa:non palpable. Perineum: non tender  Anus: no hemorrhoids  Rectum: deferred.      PELVIS FLOOR NEUROMUSCULAR FUNCTION:  Strength:  Tender  Perineal Sensation:  Normal      PELVIC SUPPO

## 2022-01-21 ENCOUNTER — TELEPHONE (OUTPATIENT)
Dept: UROLOGY | Facility: HOSPITAL | Age: 57
End: 2022-01-21

## 2022-04-22 ENCOUNTER — APPOINTMENT (OUTPATIENT)
Dept: GENERAL RADIOLOGY | Age: 57
End: 2022-04-22
Attending: NURSE PRACTITIONER
Payer: MEDICAID

## 2022-04-22 ENCOUNTER — HOSPITAL ENCOUNTER (OUTPATIENT)
Age: 57
Discharge: HOME | End: 2022-04-22
Payer: MEDICAID

## 2022-04-22 VITALS
WEIGHT: 168 LBS | TEMPERATURE: 97 F | DIASTOLIC BLOOD PRESSURE: 103 MMHG | RESPIRATION RATE: 16 BRPM | OXYGEN SATURATION: 98 % | SYSTOLIC BLOOD PRESSURE: 151 MMHG | BODY MASS INDEX: 25.46 KG/M2 | HEART RATE: 64 BPM | HEIGHT: 68 IN

## 2022-04-22 DIAGNOSIS — Z76.0 ENCOUNTER FOR MEDICATION REFILL: ICD-10-CM

## 2022-04-22 DIAGNOSIS — S49.91XA INJURY OF RIGHT SHOULDER, INITIAL ENCOUNTER: Primary | ICD-10-CM

## 2022-04-22 PROCEDURE — 73030 X-RAY EXAM OF SHOULDER: CPT | Performed by: NURSE PRACTITIONER

## 2022-04-22 PROCEDURE — 99213 OFFICE O/P EST LOW 20 MIN: CPT

## 2022-04-22 NOTE — ED INITIAL ASSESSMENT (HPI)
Patient reports shoulder pain after moving an heavy object, reaching up for a light, and bringing an object to her. Today the pain is burning and her shoulder is stiff.

## 2023-01-22 ENCOUNTER — APPOINTMENT (OUTPATIENT)
Dept: CT IMAGING | Age: 58
End: 2023-01-22
Attending: EMERGENCY MEDICINE
Payer: MEDICAID

## 2023-01-22 ENCOUNTER — HOSPITAL ENCOUNTER (OUTPATIENT)
Age: 58
Discharge: HOME | End: 2023-01-22
Attending: EMERGENCY MEDICINE
Payer: MEDICAID

## 2023-01-22 VITALS
OXYGEN SATURATION: 98 % | DIASTOLIC BLOOD PRESSURE: 84 MMHG | WEIGHT: 170 LBS | TEMPERATURE: 98 F | HEART RATE: 68 BPM | BODY MASS INDEX: 25.76 KG/M2 | SYSTOLIC BLOOD PRESSURE: 140 MMHG | RESPIRATION RATE: 16 BRPM | HEIGHT: 68 IN

## 2023-01-22 DIAGNOSIS — S16.1XXA STRAIN OF NECK MUSCLE, INITIAL ENCOUNTER: ICD-10-CM

## 2023-01-22 DIAGNOSIS — G43.809 OTHER MIGRAINE WITHOUT STATUS MIGRAINOSUS, NOT INTRACTABLE: Primary | ICD-10-CM

## 2023-01-22 PROCEDURE — 99214 OFFICE O/P EST MOD 30 MIN: CPT

## 2023-01-22 PROCEDURE — 70450 CT HEAD/BRAIN W/O DYE: CPT | Performed by: EMERGENCY MEDICINE

## 2023-01-22 PROCEDURE — 96375 TX/PRO/DX INJ NEW DRUG ADDON: CPT

## 2023-01-22 PROCEDURE — 72125 CT NECK SPINE W/O DYE: CPT | Performed by: EMERGENCY MEDICINE

## 2023-01-22 PROCEDURE — 93005 ELECTROCARDIOGRAM TRACING: CPT

## 2023-01-22 PROCEDURE — 96361 HYDRATE IV INFUSION ADD-ON: CPT

## 2023-01-22 PROCEDURE — 93010 ELECTROCARDIOGRAM REPORT: CPT

## 2023-01-22 PROCEDURE — 96374 THER/PROPH/DIAG INJ IV PUSH: CPT

## 2023-01-22 RX ORDER — ONDANSETRON 2 MG/ML
4 INJECTION INTRAMUSCULAR; INTRAVENOUS ONCE
Status: COMPLETED | OUTPATIENT
Start: 2023-01-22 | End: 2023-01-22

## 2023-01-22 RX ORDER — MECLIZINE HYDROCHLORIDE 25 MG/1
25 TABLET ORAL 3 TIMES DAILY PRN
Qty: 20 TABLET | Refills: 0 | Status: SHIPPED | OUTPATIENT
Start: 2023-01-22

## 2023-01-22 RX ORDER — KETOROLAC TROMETHAMINE 30 MG/ML
30 INJECTION, SOLUTION INTRAMUSCULAR; INTRAVENOUS ONCE
Status: COMPLETED | OUTPATIENT
Start: 2023-01-22 | End: 2023-01-22

## 2023-01-22 RX ORDER — SODIUM CHLORIDE 9 MG/ML
1000 INJECTION, SOLUTION INTRAVENOUS ONCE
Status: COMPLETED | OUTPATIENT
Start: 2023-01-22 | End: 2023-01-22
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
## 2023-01-23 LAB
ATRIAL RATE: 69 BPM
P AXIS: 26 DEGREES
P-R INTERVAL: 132 MS
Q-T INTERVAL: 422 MS
QRS DURATION: 80 MS
QTC CALCULATION (BEZET): 452 MS
R AXIS: 48 DEGREES
T AXIS: 44 DEGREES
VENTRICULAR RATE: 69 BPM

## 2023-03-04 ENCOUNTER — HOSPITAL ENCOUNTER (OUTPATIENT)
Age: 58
Discharge: HOME | End: 2023-03-04
Payer: MEDICAID

## 2023-03-04 VITALS
RESPIRATION RATE: 16 BRPM | TEMPERATURE: 98 F | BODY MASS INDEX: 28.04 KG/M2 | DIASTOLIC BLOOD PRESSURE: 117 MMHG | HEART RATE: 66 BPM | SYSTOLIC BLOOD PRESSURE: 147 MMHG | WEIGHT: 185 LBS | OXYGEN SATURATION: 99 % | HEIGHT: 68 IN

## 2023-03-04 DIAGNOSIS — H66.001 ACUTE SUPPURATIVE OTITIS MEDIA OF RIGHT EAR WITHOUT SPONTANEOUS RUPTURE OF TYMPANIC MEMBRANE, RECURRENCE NOT SPECIFIED: ICD-10-CM

## 2023-03-04 DIAGNOSIS — I10 ELEVATED BLOOD PRESSURE READING WITH DIAGNOSIS OF HYPERTENSION: Primary | ICD-10-CM

## 2023-03-04 DIAGNOSIS — H60.90 OTITIS EXTERNA, UNSPECIFIED CHRONICITY, UNSPECIFIED LATERALITY, UNSPECIFIED TYPE: ICD-10-CM

## 2023-03-04 PROCEDURE — 99214 OFFICE O/P EST MOD 30 MIN: CPT

## 2023-03-04 PROCEDURE — 99213 OFFICE O/P EST LOW 20 MIN: CPT

## 2023-03-04 RX ORDER — DEXAMETHASONE 4 MG/1
12 TABLET ORAL ONCE
Status: COMPLETED | OUTPATIENT
Start: 2023-03-04 | End: 2023-03-04

## 2023-03-04 RX ORDER — NEOMYCIN SULFATE, POLYMYXIN B SULFATE, HYDROCORTISONE 3.5; 10000; 1 MG/ML; [USP'U]/ML; MG/ML
3 SOLUTION/ DROPS AURICULAR (OTIC) 3 TIMES DAILY
Qty: 1 EACH | Refills: 0 | Status: SHIPPED | OUTPATIENT
Start: 2023-03-04 | End: 2023-03-14

## 2023-03-04 RX ORDER — AMOXICILLIN 875 MG/1
875 TABLET, COATED ORAL 2 TIMES DAILY
Qty: 20 TABLET | Refills: 0 | Status: SHIPPED | OUTPATIENT
Start: 2023-03-04 | End: 2023-03-14

## 2023-03-04 RX ORDER — IBUPROFEN 600 MG/1
600 TABLET ORAL ONCE
Status: COMPLETED | OUTPATIENT
Start: 2023-03-04 | End: 2023-03-04

## 2023-03-04 NOTE — DISCHARGE INSTRUCTIONS
Please return to the ER/clinic if symptoms worsen. Follow-up with your PCP in 24-48 hours as needed. Take the full course of antibiotics as prescribed in tandem with a probiotic daily. Take the full course of eardrops as prescribed. Take Motrin and/or Tylenol for pain. I was able to fill your metoprolol 100 mg daily at [facility] however I am unsure of the other medication you are on. Tried calling [employer] however was told to leave a message. These go over to [employer] and have them do a printout of your medications to have filled by your primary care physician. If anything changes i.e. increasing headaches vision changes blurry vision etc. go to the emergency room.   Otherwise follow-up with your primary care physician for further evaluation and treatment

## 2023-03-09 ENCOUNTER — APPOINTMENT (OUTPATIENT)
Dept: GENERAL RADIOLOGY | Age: 58
End: 2023-03-09
Attending: EMERGENCY MEDICINE
Payer: MEDICAID

## 2023-03-09 ENCOUNTER — HOSPITAL ENCOUNTER (OUTPATIENT)
Age: 58
Discharge: HOME | End: 2023-03-09
Attending: EMERGENCY MEDICINE
Payer: MEDICAID

## 2023-03-09 VITALS
DIASTOLIC BLOOD PRESSURE: 89 MMHG | TEMPERATURE: 98 F | OXYGEN SATURATION: 98 % | HEART RATE: 98 BPM | RESPIRATION RATE: 18 BRPM | SYSTOLIC BLOOD PRESSURE: 108 MMHG

## 2023-03-09 DIAGNOSIS — J06.9 VIRAL URI WITH COUGH: Primary | ICD-10-CM

## 2023-03-09 DIAGNOSIS — R10.10 UPPER ABDOMINAL PAIN: ICD-10-CM

## 2023-03-09 LAB — SARS-COV-2 RNA RESP QL NAA+PROBE: NOT DETECTED

## 2023-03-09 PROCEDURE — 71046 X-RAY EXAM CHEST 2 VIEWS: CPT | Performed by: EMERGENCY MEDICINE

## 2023-03-09 PROCEDURE — 99214 OFFICE O/P EST MOD 30 MIN: CPT

## 2023-03-09 RX ORDER — SUCRALFATE 1 G/1
1 TABLET ORAL
Qty: 40 TABLET | Refills: 0 | Status: SHIPPED | OUTPATIENT
Start: 2023-03-09

## 2023-03-09 RX ORDER — LIDOCAINE HYDROCHLORIDE 20 MG/ML
10 SOLUTION OROPHARYNGEAL ONCE
Status: COMPLETED | OUTPATIENT
Start: 2023-03-09 | End: 2023-03-09

## 2023-03-09 RX ORDER — BENZONATATE 100 MG/1
100 CAPSULE ORAL 3 TIMES DAILY PRN
Qty: 30 CAPSULE | Refills: 0 | Status: SHIPPED | OUTPATIENT
Start: 2023-03-09 | End: 2023-04-08

## 2023-03-09 RX ORDER — MAGNESIUM HYDROXIDE/ALUMINUM HYDROXICE/SIMETHICONE 120; 1200; 1200 MG/30ML; MG/30ML; MG/30ML
30 SUSPENSION ORAL ONCE
Status: COMPLETED | OUTPATIENT
Start: 2023-03-09 | End: 2023-03-09

## 2023-03-09 NOTE — ED INITIAL ASSESSMENT (HPI)
C/o 3-4 days of abdominal and back pain with cough, runny nose and left nostril hurts and burning sensation. Feeling nauseous and diarrheal stool today. Currently on Amoxicillin for ear infection dx here on 3/4/23.

## 2023-03-09 NOTE — DISCHARGE INSTRUCTIONS
Use your albuterol inhaler 2 puffs every 4 hours as needed for cough. Avoid aspirin, ibuprofen and Aleve. You declined CT scanning in the immediate care. If your symptoms persist or you develop new symptoms, you should go to the emergency department for lab work and imaging. Acetaminophen 1000 mg every 6 hours as needed for pain. Continue omeprazole.

## 2023-03-15 ENCOUNTER — HOSPITAL ENCOUNTER (OUTPATIENT)
Age: 58
Discharge: HOME | End: 2023-03-15
Payer: MEDICAID

## 2023-03-15 ENCOUNTER — APPOINTMENT (OUTPATIENT)
Dept: CT IMAGING | Age: 58
End: 2023-03-15
Attending: NURSE PRACTITIONER
Payer: MEDICAID

## 2023-03-15 VITALS
RESPIRATION RATE: 18 BRPM | WEIGHT: 185 LBS | TEMPERATURE: 97 F | HEART RATE: 56 BPM | OXYGEN SATURATION: 100 % | DIASTOLIC BLOOD PRESSURE: 87 MMHG | SYSTOLIC BLOOD PRESSURE: 131 MMHG | BODY MASS INDEX: 28 KG/M2

## 2023-03-15 DIAGNOSIS — R10.9 ABDOMINAL PAIN OF UNKNOWN ETIOLOGY: ICD-10-CM

## 2023-03-15 DIAGNOSIS — J01.90 ACUTE SINUSITIS, RECURRENCE NOT SPECIFIED, UNSPECIFIED LOCATION: Primary | ICD-10-CM

## 2023-03-15 LAB
#MXD IC: 0.4 X10ˆ3/UL (ref 0.1–1)
BUN BLD-MCNC: 21 MG/DL (ref 7–18)
CHLORIDE BLD-SCNC: 112 MMOL/L (ref 98–112)
CO2 BLD-SCNC: 26 MMOL/L (ref 21–32)
CREAT BLD-MCNC: 0.7 MG/DL
GFR SERPLBLD BASED ON 1.73 SQ M-ARVRAT: 102 ML/MIN/1.73M2 (ref 60–?)
GLUCOSE BLD-MCNC: 89 MG/DL (ref 70–99)
HCT VFR BLD AUTO: 39 %
HCT VFR BLD CALC: 40 %
HGB BLD-MCNC: 12.5 G/DL
ISTAT IONIZED CALCIUM FOR CHEM 8: 1.08 MMOL/L (ref 1.12–1.32)
LIPASE SERPL-CCNC: 26 U/L (ref 13–75)
LYMPHOCYTES # BLD AUTO: 3.2 X10ˆ3/UL (ref 1–4)
LYMPHOCYTES NFR BLD AUTO: 44.4 %
MCH RBC QN AUTO: 29 PG (ref 26–34)
MCHC RBC AUTO-ENTMCNC: 32.1 G/DL (ref 31–37)
MCV RBC AUTO: 90.5 FL (ref 80–100)
MIXED CELL %: 5.5 %
NEUTROPHILS # BLD AUTO: 3.7 X10ˆ3/UL (ref 1.5–7.7)
NEUTROPHILS NFR BLD AUTO: 50.1 %
PLATELET # BLD AUTO: 235 X10ˆ3/UL (ref 150–450)
POCT BILIRUBIN URINE: NEGATIVE
POCT BLOOD URINE: NEGATIVE
POCT GLUCOSE URINE: NEGATIVE MG/DL
POCT KETONE URINE: NEGATIVE MG/DL
POCT LEUKOCYTE ESTERASE URINE: NEGATIVE
POCT NITRITE URINE: NEGATIVE
POCT PH URINE: 5 (ref 5–8)
POCT PROTEIN URINE: NEGATIVE MG/DL
POCT SPECIFIC GRAVITY URINE: 1.02
POCT URINE CLARITY: CLEAR
POCT URINE COLOR: YELLOW
POCT UROBILINOGEN URINE: 0.2 MG/DL
POTASSIUM BLD-SCNC: 4 MMOL/L (ref 3.6–5.1)
RBC # BLD AUTO: 4.31 X10ˆ6/UL
S PYO AG THROAT QL IA.RAPID: NEGATIVE
SODIUM BLD-SCNC: 144 MMOL/L (ref 136–145)
WBC # BLD AUTO: 7.3 X10ˆ3/UL (ref 4–11)

## 2023-03-15 PROCEDURE — 96361 HYDRATE IV INFUSION ADD-ON: CPT

## 2023-03-15 PROCEDURE — 87651 STREP A DNA AMP PROBE: CPT | Performed by: NURSE PRACTITIONER

## 2023-03-15 PROCEDURE — 99214 OFFICE O/P EST MOD 30 MIN: CPT

## 2023-03-15 PROCEDURE — 85025 COMPLETE CBC W/AUTO DIFF WBC: CPT | Performed by: NURSE PRACTITIONER

## 2023-03-15 PROCEDURE — 80047 BASIC METABLC PNL IONIZED CA: CPT

## 2023-03-15 PROCEDURE — 81002 URINALYSIS NONAUTO W/O SCOPE: CPT | Performed by: NURSE PRACTITIONER

## 2023-03-15 PROCEDURE — 74177 CT ABD & PELVIS W/CONTRAST: CPT | Performed by: NURSE PRACTITIONER

## 2023-03-15 PROCEDURE — 99215 OFFICE O/P EST HI 40 MIN: CPT

## 2023-03-15 PROCEDURE — 83690 ASSAY OF LIPASE: CPT | Performed by: NURSE PRACTITIONER

## 2023-03-15 PROCEDURE — 96374 THER/PROPH/DIAG INJ IV PUSH: CPT

## 2023-03-15 PROCEDURE — 96375 TX/PRO/DX INJ NEW DRUG ADDON: CPT

## 2023-03-15 RX ORDER — FLUTICASONE PROPIONATE 50 MCG
2 SPRAY, SUSPENSION (ML) NASAL DAILY
Qty: 16 G | Refills: 0 | Status: SHIPPED | OUTPATIENT
Start: 2023-03-15 | End: 2023-04-14

## 2023-03-15 RX ORDER — KETOROLAC TROMETHAMINE 15 MG/ML
15 INJECTION, SOLUTION INTRAMUSCULAR; INTRAVENOUS EVERY 6 HOURS PRN
Status: DISCONTINUED | OUTPATIENT
Start: 2023-03-15 | End: 2023-03-15

## 2023-03-15 RX ORDER — AMOXICILLIN AND CLAVULANATE POTASSIUM 875; 125 MG/1; MG/1
1 TABLET, FILM COATED ORAL 2 TIMES DAILY
Qty: 10 TABLET | Refills: 0 | Status: SHIPPED | OUTPATIENT
Start: 2023-03-15 | End: 2023-03-20

## 2023-03-15 RX ORDER — ONDANSETRON 2 MG/ML
4 INJECTION INTRAMUSCULAR; INTRAVENOUS ONCE
Status: COMPLETED | OUTPATIENT
Start: 2023-03-15 | End: 2023-03-15

## 2023-03-15 RX ORDER — SODIUM CHLORIDE 9 MG/ML
1000 INJECTION, SOLUTION INTRAVENOUS ONCE
Status: COMPLETED | OUTPATIENT
Start: 2023-03-15 | End: 2023-03-15

## 2023-03-15 RX ORDER — METHYLPREDNISOLONE 4 MG/1
TABLET ORAL
Qty: 1 EACH | Refills: 0 | Status: SHIPPED | OUTPATIENT
Start: 2023-03-15

## 2023-03-15 NOTE — ED INITIAL ASSESSMENT (HPI)
C/o bilaterally ear pain for 2 weeks. Pt report being seen and treated here at [facility]. Pt reports improvement in ears but now pain is back.

## 2023-03-16 NOTE — DISCHARGE INSTRUCTIONS
Sinusitis   Drink plenty of liquids. May use a humidifier    Warm compresses to face. May use nasal saline rinses or geraldo pot. Take antibiotic as directed. For female patients: Whenever taking antibiotics, hormonal contraceptive medications may be less effective; you should therefore use barrier protection or some other form of secondary contraception while taking the antibiotic and for at least one week after the antibiotic is finished. Medrol Dosepak /prednisone as directed with food. Do not take Ibuprofen-like products (Motrin, Advil, Aleve, Naproxen) while taking prednisone. Start this medication tomorrow        Flonase/Fluticasone or Nasocort (2sprays each nostril) once per day will help alleviate nasal pressure. You may also use saline nasal sprays throughout the day. You may take tylenol, as needed, for any discomfort. ENT follow-up          Unclear reason for your abdominal pain. Your CT shows some small fat filled inguinal hernias but this should not cause the pain that you are having. Please follow-up with your primary care provider. Go to the emergency department if your abdominal pain increases or if you start to run fever or you are vomiting. Continue to take your Protonix and your sucralfate. [place] diet. You may need to align yourself with a gastroenterologist again.

## 2023-07-07 ENCOUNTER — OFFICE VISIT (OUTPATIENT)
Dept: UROLOGY | Facility: CLINIC | Age: 58
End: 2023-07-07
Attending: OBSTETRICS & GYNECOLOGY
Payer: MEDICAID

## 2023-07-07 VITALS
SYSTOLIC BLOOD PRESSURE: 126 MMHG | BODY MASS INDEX: 26.37 KG/M2 | HEIGHT: 67.5 IN | DIASTOLIC BLOOD PRESSURE: 78 MMHG | WEIGHT: 170 LBS

## 2023-07-07 DIAGNOSIS — N94.10 DYSPAREUNIA, FEMALE: ICD-10-CM

## 2023-07-07 DIAGNOSIS — N39.41 URGE INCONTINENCE: ICD-10-CM

## 2023-07-07 DIAGNOSIS — N39.3 STRESS INCONTINENCE OF URINE: Primary | ICD-10-CM

## 2023-07-07 LAB
BLOOD URINE: NEGATIVE
CONTROL RUN WITHIN 24 HOURS?: YES
LEUKOCYTE ESTERASE URINE: NEGATIVE
NITRITE URINE: NEGATIVE

## 2023-07-07 PROCEDURE — 81002 URINALYSIS NONAUTO W/O SCOPE: CPT

## 2023-07-07 PROCEDURE — 51797 INTRAABDOMINAL PRESSURE TEST: CPT

## 2023-07-07 PROCEDURE — 51741 ELECTRO-UROFLOWMETRY FIRST: CPT

## 2023-07-07 PROCEDURE — 51729 CYSTOMETROGRAM W/VP&UP: CPT

## 2023-07-07 PROCEDURE — 51784 ANAL/URINARY MUSCLE STUDY: CPT

## 2023-07-07 NOTE — PROCEDURES
Patient here for urodynamic testing. Procedure explained and confirmed by patient. See evaluation form for results. Both verbal and written discharge instructions were given. Patient tolerated procedure well and will follow up with Dr. Rudolph Proctor on 23. URODYNAMIC EVALUATION    PATIENT HISTORY:    Prolapse:  No  ARIEL:  Yes - previous sling in  - reports that this initially helped with the ARIEL  UUI:  Yes - feels urgency  Nocturia:  4 - pad is often wet when she wakes up  Frequency:  every 2 hours  Incomplete Emptying:  Yes ,but unsure  Constipation:  No- states bowels are usually loose<30  Last void prior to UDS testin minutes  Current urge to void? Moderate  OAB meds stopped prior to test?  NA  Other symptoms? Last visit with Dr. Dulce Jonas was 2022 - patient reports that she delayed follow up due to having covid 2 to 3 times and also shepherd emotional difficulties with leaving her home. Has gained 25 lbs since sling procedure in   Surgery? [x]  No  []  Yes, specify date:      PATIENT DIAGNOSIS:  Urge Incontinence N39.41 and Stress Incontinence N39.3    MEDICATION: albuterol sulfate (2.5 MG/3ML) 0.083% Inhalation Nebu Soln, Take 3 mL (2.5 mg total) by nebulization every 6 (six) hours as needed for Wheezing., Disp: 25 ampule, Rfl: 0  Albuterol Sulfate  (90 Base) MCG/ACT Inhalation Aero Soln, Inhale 1 puff into the lungs every 6 (six) hours as needed for Wheezing., Disp: 1 Inhaler, Rfl: 0  Butalbital-APAP-Caffeine -40 MG Oral Tab, Take 1 tablet by mouth every 6 (six) hours as needed for Pain., Disp: 10 tablet, Rfl: 0  escitalopram (LEXAPRO) 20 MG Oral Tab, Take 1 tablet (20 mg total) by mouth every evening., Disp: 30 tablet, Rfl: 0  HYDROcodone-acetaminophen  MG Oral Tab, Take 2 tablets by mouth in the morning and 2 tablets before bedtime. , Disp: , Rfl:   Dicyclomine HCl 10 MG Oral Cap, Take 1 capsule (10 mg total) by mouth 4 (four) times daily before meals and nightly., Disp: , Rfl:          ALLERGIES:  Crabs (Crustaceans), Morphine, Dairy Products, Doxycycline, and Reglan [Metoclopramide]      EXAM:  Urinalysis Dip:  Today's Results   Component Date Value    control run 07/07/2023 Yes     Blood Urine 07/07/2023 Negative     Nitrite Urine 07/07/2023 Negative     Leukocyte esterase urine 07/07/2023 Negative       Urovesico Junction ( <30 degrees ):  []  Mobile  [x]  Fixed    Perineal Sensation:  [x]  Normal  []  Abnormal    Additional Notes:    PROLAPSE (past introitus):  []  Yes  [x]  No  Prolapse reduced for testing? []  Yes  [x]  No  []  Pessary  []  Speculum  []  Proctoswab  []  Vag Packing    Additional Notes:    UROFLOWMETRY:  Voided Volume:                      16       mL  Maximum Flow Rate:                            4    mL/sec  Average flow rate:                        2     mL/sec  Post-void Residual:                       3    mL  Pattern:  []  Normal  [x]  Poor flow     []  Intermittent  []  Other  Void:   [x]  Typical  []  Atypical    Additional Notes: Reports that she does go in small amounts at times    CYSTOMETRY:  Urethral Catheter:  Fr 7 / tdoc  Abd Catheter:     Fr 7 / tdoc   Infusion:  Water Rate 30mL/min  Temp:  Room  Position:  [x]  Sit  []  Stand  []  Supine  First sensation:   41 mL  First desire to void:   116 mL  Strong desire to void:  205 mL  Maximum cystometric capacity:   290 mL  Detrusor Activity:  []  Unstable   [x]  Stable  Urge leakage? []  Yes [x]  No  Volume at 1st unhibited detrusor cont:    mL  Detrusor instability provoked by:    []  Spontaneous []  Coughing  []  Filling  []  Valsalva  []  Other    Additional Notes:  Complained of pelvic pressure and pain at capacity     URETHRAL FUNCTION:  Valsava (vesical) Leak Point Pressures:    Volume Leak Point Pressure Leak?     Cough Valsalva      100mL 159   124  cm H2O [x]  Yes []  No   200mL 168 156    cm H2O [x]  Yes []  No   Maximum Cystomatric Capacity  290 mL 128 73cm H2O [x]  Yes []  No       Genuine Stress Incontinence demonstrated?    [x]  Yes @ 100 ml , 200 ml with cough, @ 290 ml ( capacity) with cough and valsalva  []  No    Resting Urethral Pressure Profile:     Functional Urethral Length:      0.6    cm            0.6     cm     Maximum UCP:           107       cm               97 C             cm       PRESSURE/FLOW STUDY:  Voided volume:       340 mL  Maximum flow rate:       13 mL/sec  Pressure Detrusor (at maximum flow):           ( -12)   pves removed cm H2O  Post void residual:       3        mL  Voiding mechanism:  []  Abnormal  [x]  Normal  []  Strain to void   []  Weak detrusor  Void:   []  Typical   [x]  Atypical    Additional Notes: Voided 140 ml  - pves removed and she was able to complete void to 340 ml    EMG:  [x]  Reactive []  Non-Reactive    7/7/2023 2:13 PM     PERFORMED BY:  Genna Jung RN

## 2023-07-07 NOTE — PATIENT INSTRUCTIONS
[facility] UROGYNECOLOGY  Betyjay Ghotra [address] [address]  [address] [address]: [phone]  FAX: [phone]       Urodynamic Testing Discharge Instructions: There are NO dietary or activity restrictions. You may resume your normal schedule. You may have mild discomfort for a few hours after your testing today. There may be some mild burning when you urinate or you may see some blood in your urine. These problems should not last more than 24 hours. The following suggestions may minimize any symptoms you experience. Drink 6-8 large glasses of water over the next 8 hours  A compress or sitz bath may be soothing  Tylenol or Ibuprofen may be taken as needed    If you experience any of the following, please call the office or, if after hours, the on-call physician at [phone]. Excessive pain  Bright red bloody discharge  Fever or chills  Continued urgency, frequency or burning with urination    Obtaining Test Results    Your urodynamic test will be interpreted by a specialist and available to the referring physician within 7-14 days. Patients in our clinic are given an appointment to come back to discuss the results and any appropriate treatment recommendations. Please do not hesitate to contact our office with any questions or concerns at [phone]. I acknowledge that I have received verbal and written discharge  instructions and that I understand these instructions clearly.     Patient Signature:    Date:

## 2023-09-08 ENCOUNTER — TELEPHONE (OUTPATIENT)
Dept: UROLOGY | Facility: CLINIC | Age: 58
End: 2023-09-08

## 2023-09-08 NOTE — TELEPHONE ENCOUNTER
Contacted patient regarding missed appointment. LVM for patient to call back to reschedule UDS follow-up.

## 2023-09-18 ENCOUNTER — HOSPITAL ENCOUNTER (OUTPATIENT)
Age: 58
Discharge: HOME | End: 2023-09-18
Payer: MEDICAID

## 2023-09-18 ENCOUNTER — APPOINTMENT (OUTPATIENT)
Dept: GENERAL RADIOLOGY | Age: 58
End: 2023-09-18
Attending: NURSE PRACTITIONER
Payer: MEDICAID

## 2023-09-18 VITALS
SYSTOLIC BLOOD PRESSURE: 118 MMHG | DIASTOLIC BLOOD PRESSURE: 86 MMHG | BODY MASS INDEX: 27.28 KG/M2 | HEIGHT: 68 IN | OXYGEN SATURATION: 96 % | TEMPERATURE: 98 F | RESPIRATION RATE: 18 BRPM | WEIGHT: 180 LBS | HEART RATE: 75 BPM

## 2023-09-18 DIAGNOSIS — J01.10 ACUTE NON-RECURRENT FRONTAL SINUSITIS: Primary | ICD-10-CM

## 2023-09-18 LAB — SARS-COV-2 RNA RESP QL NAA+PROBE: NOT DETECTED

## 2023-09-18 PROCEDURE — 99214 OFFICE O/P EST MOD 30 MIN: CPT

## 2023-09-18 PROCEDURE — 71046 X-RAY EXAM CHEST 2 VIEWS: CPT | Performed by: NURSE PRACTITIONER

## 2023-09-18 RX ORDER — ALBUTEROL SULFATE 90 UG/1
2 AEROSOL, METERED RESPIRATORY (INHALATION) EVERY 4 HOURS PRN
Qty: 18 G | Refills: 0 | Status: SHIPPED | OUTPATIENT
Start: 2023-09-18

## 2023-09-18 RX ORDER — METHYLPREDNISOLONE 4 MG/1
TABLET ORAL
Qty: 21 TABLET | Refills: 0 | Status: SHIPPED | OUTPATIENT
Start: 2023-09-18

## 2023-09-18 NOTE — DISCHARGE INSTRUCTIONS
Most people get better in 10 to 14 days. You may continue to cough for 2 to 3 weeks. Colds are caused by viruses and do not get better with antibiotics. Any over-the-counter medications will help relieve your symptoms. Mucinex D, this can be obtained from the pharmacist and a 's license is needed to purchase this.   Otherwise Mucinex DM is a decent alternative  Start taking one of the following allergy medications Zyrtec, Claritin or Xyzal daily  Cough syrup such as Delsym or Robitussin can help  Drink plenty of fluids  Rest, Tylenol and Motrin for aches and pains  Return for fever, shortness of breath, chest pain

## 2023-09-18 NOTE — ED INITIAL ASSESSMENT (HPI)
Pt states having bilateral ear pain, abd pain, nausea, headache, dizziness. Onset of symptoms started 3 weeks ago. Took amoxicillin 3 weeks ago, and a dose of steroids about 2 weeks ago. States she took \"emergency meds\"   has been feeling sob for last 3 weeks, and chest discomfort for last month.

## 2024-04-12 ENCOUNTER — APPOINTMENT (OUTPATIENT)
Dept: GENERAL RADIOLOGY | Age: 59
End: 2024-04-12
Attending: PHYSICIAN ASSISTANT
Payer: COMMERCIAL

## 2024-04-12 ENCOUNTER — HOSPITAL ENCOUNTER (OUTPATIENT)
Age: 59
Discharge: HOME | End: 2024-04-12
Payer: COMMERCIAL

## 2024-04-12 VITALS
TEMPERATURE: 97 F | DIASTOLIC BLOOD PRESSURE: 89 MMHG | RESPIRATION RATE: 16 BRPM | BODY MASS INDEX: 25.66 KG/M2 | WEIGHT: 169.31 LBS | SYSTOLIC BLOOD PRESSURE: 142 MMHG | OXYGEN SATURATION: 98 % | HEART RATE: 64 BPM | HEIGHT: 68 IN

## 2024-04-12 DIAGNOSIS — S63.502A LEFT WRIST SPRAIN, INITIAL ENCOUNTER: Primary | ICD-10-CM

## 2024-04-12 DIAGNOSIS — M19.032 OSTEOARTHRITIS OF LEFT WRIST, UNSPECIFIED OSTEOARTHRITIS TYPE: ICD-10-CM

## 2024-04-12 DIAGNOSIS — S63.501A SPRAIN OF RIGHT WRIST, INITIAL ENCOUNTER: ICD-10-CM

## 2024-04-12 PROCEDURE — 73130 X-RAY EXAM OF HAND: CPT | Performed by: PHYSICIAN ASSISTANT

## 2024-04-12 PROCEDURE — 99213 OFFICE O/P EST LOW 20 MIN: CPT

## 2024-04-12 PROCEDURE — 73110 X-RAY EXAM OF WRIST: CPT | Performed by: PHYSICIAN ASSISTANT

## 2024-04-12 PROCEDURE — 99214 OFFICE O/P EST MOD 30 MIN: CPT

## 2024-04-12 NOTE — DISCHARGE INSTRUCTIONS
Ibuprofen 600mg 3 times a day with food for 3 to 5 days, may alternate with Tylenol 500mg  Light activity, warm cool compresses topically for discomfort  Return if worse        Velcro wrist splint    Follow-up with occupational health      The best treatment for minor injuries is R.I.C.E. and NSAID medications.      R.I.C.E. = Rest  Ice  Compression  Elevation      Rest: Do not use the injured body part unnecessarily.  If this is a lower extremity, do not take long walks, run or do anything that causes increased pain.  Gradually progress to your normal activity, using pain as your guide.       Ice: Apply cold compresses to the injured area. The area that is injured is inflammed. Inflammation causes warmth, so ice may give some relief.  You may ice through a brace or ace wrap.      Compression: Compression to the area will help control swelling. An ace wrap is the most simple form of compression. You can also wear a brace.      Elevation: Raise the injured extremity above heart level. This will reduce throbbing pain and swelling associated with injures.  Prop the injured extremity up with pillows while lying down.

## 2024-04-12 NOTE — ED PROVIDER NOTES
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  Patient states she was in an orthopedic case there was multiple heavy trays the 1 tray was unexpectedly heavy and shifted weight landing on her left wrist patient then took the weight off by using the right hand and now has pain to both areas.  Patient denies any other injury or trauma.  Patient is right-hand dominant.  Patient having pain bruising and swelling over bilateral wrists.    Objective:   Past Medical History:    Anxiety    Anxiety state, unspecified    Back pain    Depression    Esophageal reflux    Extrinsic asthma, unspecified    Fibromyalgia    Fibromyalgia    IBS    Kidney stone              Past Surgical History:   Procedure Laterality Date    Appendectomy      Appendectomy      Drain/inject medium joint/bursa N/A 1/19/2015    Procedure: COCCYX;  Surgeon: José Cordero MD;  Location: [facility]    Drain/inject medium joint/bursa N/A 8/19/2015    Procedure: COCCYX;  Surgeon: José Cordero MD;  Location: [facility]    Fluoroscopic guidance needle placement N/A 1/19/2015    Procedure: COCCYX;  Surgeon: José Cordero MD;  Location: [facility]    Fluoroscopic guidance needle placement N/A 8/19/2015    Procedure: COCCYX;  Surgeon: José Cordero MD;  Location: [facility]    Hysterectomy      M-sedaj by  phys perfrmg svc 5+ yr N/A 1/19/2015    Procedure: COCCYX;  Surgeon: José Cordero MD;  Location: [facility]    M-sedaj by  phys perfrmg svc 5+ yr N/A 8/19/2015    Procedure: COCCYX;  Surgeon: José Cordero MD;  Location: [facility]    Other       bladder sling    Other      jaw surgery for TMJ    Other surgical history      Removal gallbladder      Total abdom hysterectomy                  Social History     Socioeconomic History    Marital status:    Tobacco Use    Smoking status: Never    Smokeless tobacco: Never   Vaping Use    Vaping status: Never Used   Substance and Sexual Activity    Alcohol use: No    Drug use: No     Social Determinants of Health     Financial Resource Strain: Not on File (10/6/2022)    Received from [facility]     Financial Resource Strain     Financial Resource Strain: 0   Food Insecurity: Not at Risk (4/10/2023)    Received from [facility]     Food Insecurity     Food: 1   Transportation Needs: Not on File (10/6/2022)    Received from [facility]     Transportation Needs     Transportation: 0   Physical Activity: Not on File (10/6/2022)    Received from [facility]     Physical Activity     Physical Activity: 0   Stress: Not on File (10/6/2022)    Received from [facility]     Stress     Stress: 0   Social Connections: Not on File (10/6/2022)    Received from [facility]     Social Connections     Social Connections and Isolation: 0   Housing Stability: Not at Risk (4/10/2023)    Received from [facility]     Housing Stability     Housin              Review of Systems    Positive for stated complaint: Arm or Hand Injury  Other systems are as noted in HPI.  Constitutional and vital signs reviewed.      All other systems reviewed and negative except as noted above.    Physical Exam     ED Triage Vitals [24 1605]   /89   Pulse 64   Resp 16   Temp 97.2 °F (36.2 °C)   Temp src Temporal   SpO2 98 %   O2 Device None (Room air)       Current:/89   Pulse 64   Temp 97.2 °F (36.2 °C) (Temporal)   Resp 16   Ht 172.7 cm (5' 8\")   Wt 76.8 kg   LMP 2013   SpO2 98%   BMI 25.74 kg/m²         Physical Exam  Vitals and nursing note reviewed.   Constitutional:       General: She is not in  acute distress.     Appearance: Normal appearance. She is well-developed. She is not diaphoretic.   HENT:      Head: Normocephalic and atraumatic.   Cardiovascular:      Rate and Rhythm: Normal rate and regular rhythm.   Pulmonary:      Effort: Pulmonary effort is normal. No respiratory distress.      Breath sounds: Normal breath sounds.   Musculoskeletal:      Right forearm: Normal.      Left forearm: Normal.      Right wrist: Swelling and tenderness present. No bony tenderness.      Left wrist: Swelling, tenderness and bony tenderness present. No snuff box tenderness or crepitus. Decreased range of motion. Normal pulse.      Left hand: Swelling, tenderness and bony tenderness present. Decreased strength. Normal strength of finger abduction, thumb/finger opposition and wrist extension. Normal sensation.      Cervical back: Normal range of motion.   Skin:     General: Skin is warm and dry.      Capillary Refill: Capillary refill takes less than 2 seconds.      Coloration: Skin is not pale.      Findings: No erythema or rash.   Neurological:      Mental Status: She is alert and oriented to person, place, and time.      Motor: No abnormal muscle tone.      Coordination: Coordination normal.      Deep Tendon Reflexes: Reflexes are normal and symmetric.   Psychiatric:         Behavior: Behavior normal.         Thought Content: Thought content normal.         Judgment: Judgment normal.             ED Course   Labs Reviewed - No data to display     XR WRIST NAVICULAR COMPLETE (4 VIEWS), LEFT (CPT=73110)    Result Date: 4/12/2024  PROCEDURE:  XR WRIST NAVICULAR COMPLETE (4 VIEWS), LEFT (CPT=73110)  TECHNIQUE:  Four views were obtained including dedicated navicular view.  COMPARISON:  [facility] , XR, XR WRIST COMPLETE (MIN 3 VIEWS), LEFT (CPT=73110), 9/09/2019, 11:50 AM.  INDICATIONS:, wrist pain  PATIENT STATED HISTORY: (As transcribed by Technologist)  Medial hand and wrist pain after heavy object fell on her.                 CONCLUSION:  No fracture.  Mild dorsal soft tissue swelling. There are stable subchondral 2 mm cysts involving the radial aspect of the lunate and subchondral cysts within the triquetrum and a large 8 mm subchondral cysts within the hamate.  There is stable osteoarthritis with some narrowing of the joint space between the lunate and the triquetrum.   LOCATION:  [id number]   Dictated by (CST): Isac Wallace MD on 4/12/2024 at 5:11 PM     Finalized by (CST): Isac Wallace MD on 4/12/2024 at 5:13 PM       XR HAND (MIN 3 VIEWS), LEFT (CPT=73130)    Result Date: 4/12/2024  PROCEDURE:  XR HAND (MIN 3 VIEWS), LEFT (CPT=73130)  TECHNIQUE:  Three views of the left hand were obtained.  COMPARISON:  None.  INDICATIONS:  Arm or Hand Injury, left hand pain  PATIENT STATED HISTORY: (As transcribed by Technologist)  Medial hand and wrist pain after heavy object fell on her.              CONCLUSION:  Negative.   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  Patient has pain to bilateral wrists left greater than right.    Problems Addressed:  Left wrist sprain, initial encounter: acute illness or injury  Osteoarthritis of left wrist, unspecified osteoarthritis type: acute illness or injury  Sprain of right wrist, initial encounter: acute illness or injury    Amount and/or Complexity of Data Reviewed  Radiology: ordered and independent interpretation performed. Decision-making details documented in ED Course.     Details: I personally reviewed the patient's wrist x-ray and hand x-ray of the left side no acute fracture but patient does have significant osteoarthritic changes  ECG/medicine tests: ordered and independent interpretation performed.  Decision-making details documented in ED Course.     Details: Left velcro wrist split with spica     Risk  OTC drugs.  Risk Details: Clinical Impression: Bilateral wrist sprains, osteoarthritis of the left wrist      The differential diagnosis before testing included wrist fracture, scaphoid fracture, wrist sprain, which is a medical condition that poses a threat to life/function.     Splint,RICE instructions, discussed follow-up with occupational health Tylenol ibuprofen instructions were discussed            Disposition and Plan     Clinical Impression:  1. Left wrist sprain, initial encounter    2. Osteoarthritis of left wrist, unspecified osteoarthritis type    3. Sprain of right wrist, initial encounter         Disposition:  Discharge  4/12/2024  5:18 pm    Follow-up:  Jacob Marshall MD  [address] [place] [id number]  [phone]    Schedule an appointment as soon as possible for a visit       [address] [address]              Medications Prescribed:  Discharge Medication List as of 4/12/2024  5:33 PM               This report has been produced using speech recognition software and may contain errors related to that system including, but not limited to, errors in grammar, punctuation, and spelling, as well as words and phrases that possibly may have been recognized inappropriately.  If there are any questions or concerns, contact the dictating provider for clarification.     NOTE: The 21st Century Cares Act makes medical notes available to patients.  Be advised that this is a medical document written in medical language and may contain abbreviations or verbiage that is unfamiliar or direct.  It is primarily intended to carry relevant historical information, physical exam findings, and the clinical assessment of the physician.

## 2024-04-12 NOTE — ED INITIAL ASSESSMENT (HPI)
Heavy tray landed on L wrist on Wed and had to twist it to get it out - pt states she also had to carry heavy trays after wards and now has R wrist pain since Thurs

## 2025-03-26 ENCOUNTER — TELEPHONE (OUTPATIENT)
Dept: NEUROLOGY | Facility: CLINIC | Age: 60
End: 2025-03-26

## 2025-03-26 ENCOUNTER — OFFICE VISIT (OUTPATIENT)
Facility: CLINIC | Age: 60
End: 2025-03-26
Payer: MEDICAID

## 2025-03-26 VITALS — HEART RATE: 68 BPM | SYSTOLIC BLOOD PRESSURE: 122 MMHG | DIASTOLIC BLOOD PRESSURE: 78 MMHG | RESPIRATION RATE: 16 BRPM

## 2025-03-26 DIAGNOSIS — R42 DIZZINESS: ICD-10-CM

## 2025-03-26 DIAGNOSIS — G43.719 CHRONIC MIGRAINE WITHOUT AURA, INTRACTABLE, WITHOUT STATUS MIGRAINOSUS: Primary | ICD-10-CM

## 2025-03-26 DIAGNOSIS — H53.8 BLURRY VISION, BILATERAL: ICD-10-CM

## 2025-03-26 PROCEDURE — 99204 OFFICE O/P NEW MOD 45 MIN: CPT | Performed by: OTHER

## 2025-03-26 RX ORDER — PREGABALIN 50 MG/1
CAPSULE ORAL
COMMUNITY
Start: 2023-06-26

## 2025-03-26 RX ORDER — RIZATRIPTAN BENZOATE 10 MG/1
10 TABLET, ORALLY DISINTEGRATING ORAL
COMMUNITY
End: 2025-03-26

## 2025-03-26 RX ORDER — RIZATRIPTAN BENZOATE 10 MG/1
TABLET, ORALLY DISINTEGRATING ORAL
Qty: 9 TABLET | Refills: 2 | Status: SHIPPED | OUTPATIENT
Start: 2025-03-26

## 2025-03-26 RX ORDER — METHYLPREDNISOLONE 4 MG/1
TABLET ORAL
Qty: 21 TABLET | Refills: 0 | Status: SHIPPED | OUTPATIENT
Start: 2025-03-26

## 2025-03-26 RX ORDER — VILAZODONE HYDROCHLORIDE 20 MG/1
20 TABLET ORAL DAILY
COMMUNITY
Start: 2025-03-15

## 2025-03-26 RX ORDER — ZOLPIDEM TARTRATE 5 MG/1
5 TABLET ORAL NIGHTLY
COMMUNITY
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
## 2025-03-26 NOTE — PATIENT INSTRUCTIONS
Instructions from Dr. Box:       Stop naproxen.  Reduce fioricet to 1-2 pills a week for one month, then stop completely.  Maxalt can be used 1-2 times a week as needed.    We will seek approval for botox for migraine.    ~~~~~~~~~~~~~~~~~~~~~~~  After your visit at the [facility] office  today,  please direct any follow up questions or medication needs to the staff in our  [place] office so that your concerns may be promptly addressed.  We are available through [employer] or at the numbers below:    The phone number is:   [phone] option #1    The fax number is:  [phone]    Your pharmacy should also send any requests electronically to the [place] office.    Refill policies:    Allow 2-3 business days for refills; controlled substances may take longer.  Contact your pharmacy at least 5 days prior to running out of medication and have them send an electronic request or submit request through the “request refill” option in your [employer] account.  Refills are not addressed on weekends; covering physicians do not authorize routine medications on weekends.  No narcotics or controlled substances are refilled after noon on Fridays or by on call physicians.  By law, narcotics must be electronically prescribed.  A 30 day supply with no refills is the maximum allowed.  If your prescription is due for a refill, you may be due for a follow up appointment.  To best provide you care, patients receiving routine medications need to be seen at least once a year.  Patients receiving narcotic/controlled substance medications need to be seen at least once every 3 months.  In the event that your preferred pharmacy does not have the requested medication in stock (e.g. Backordered), it is your responsibility to find another pharmacy that has the requested medication available.  We will gladly send a new prescription to that pharmacy at your request.    Scheduling Tests:    If your physician has ordered radiology tests  such as MRI or CT scans, please contact Central Scheduling at [phone] right away to schedule the test.  Once scheduled, the [facility] Centralized Referral Team will work with your insurance carrier to obtain pre-certification or prior authorization.  Depending on your insurance carrier, approval may take 3-10 days.  It is highly recommended patients assure they have received an authorization before having a test performed.  If test is done without insurance authorization, patient may be responsible for the entire amount billed.      Precertification and Prior Authorizations:  If your physician has recommended that you have a procedure or additional testing performed the [facility] Centralized Referral Team will contact your insurance carrier to obtain pre-certification or prior authorization.    You are strongly encouraged to contact your insurance carrier to verify that your procedure/test has been approved and is a COVERED benefit.  Although the [facility] Centralized Referral Team does its due diligence, the insurance carrier gives the disclaimer that \"Although the procedure is authorized, this does not guarantee payment.\"    Ultimately the patient is responsible for payment.   Thank you for your understanding in this matter.  Paperwork Completion:  If you require FMLA or disability paperwork for your recovery, please make sure to either drop it off or have it faxed to our office at [phone]. Be sure the form has your name and date of birth on it.  The form will be faxed to our Forms Department and they will complete it for you.  There is a 25$ fee for all forms that need to be filled out.  Please be aware there is a 10-14 day turnaround time.  You will need to sign a release of information (BRANDY) form if your paperwork does not come with one.  You may call the Forms Department with any questions at [phone].  Their fax number is [phone].

## 2025-03-26 NOTE — TELEPHONE ENCOUNTER
Pt in office for consultation with Dr. Box    Per Dr. Box, pt was on Botox in the past, was effective. To restart    Referral placed.

## 2025-03-27 NOTE — TELEPHONE ENCOUNTER
Received authorization for Botox from [employer].      Auth#[id number] from 03/27/25-03/28/26-4 visits    This is buy and bill.      Once 200U of Botox is available, please call patient to schedule appointment, route back to the PA team.

## 2025-03-31 NOTE — TELEPHONE ENCOUNTER
Spoke with patient and scheduled first available for botox with Dr. Box.    Future Appointments   Date Time Provider Department Center   6/12/2025 10:20 AM Dave Box MD [facility]

## 2025-06-12 ENCOUNTER — OFFICE VISIT (OUTPATIENT)
Dept: NEUROLOGY | Facility: CLINIC | Age: 60
End: 2025-06-12
Payer: MEDICAID

## 2025-06-12 VITALS
WEIGHT: 166.63 LBS | SYSTOLIC BLOOD PRESSURE: 132 MMHG | RESPIRATION RATE: 16 BRPM | DIASTOLIC BLOOD PRESSURE: 78 MMHG | BODY MASS INDEX: 25 KG/M2 | HEART RATE: 67 BPM

## 2025-06-12 DIAGNOSIS — G44.40 MEDICATION OVERUSE HEADACHE: ICD-10-CM

## 2025-06-12 DIAGNOSIS — G43.719 CHRONIC MIGRAINE WITHOUT AURA, INTRACTABLE, WITHOUT STATUS MIGRAINOSUS: Primary | ICD-10-CM

## 2025-06-12 PROCEDURE — 64615 CHEMODENERV MUSC MIGRAINE: CPT | Performed by: OTHER

## 2025-06-12 PROCEDURE — 99214 OFFICE O/P EST MOD 30 MIN: CPT | Performed by: OTHER

## 2025-06-12 RX ORDER — BUTALBITAL, ACETAMINOPHEN AND CAFFEINE 50; 325; 40 MG/1; MG/1; MG/1
1 TABLET ORAL EVERY 6 HOURS PRN
Qty: 15 TABLET | Refills: 2 | Status: SHIPPED | OUTPATIENT
Start: 2025-06-12

## 2025-06-12 RX ORDER — AMLODIPINE BESYLATE 5 MG/1
5 TABLET ORAL DAILY
COMMUNITY
Start: 2025-01-21

## 2025-06-12 RX ORDER — RIZATRIPTAN BENZOATE 10 MG/1
TABLET, ORALLY DISINTEGRATING ORAL
Qty: 9 TABLET | Refills: 2 | Status: SHIPPED | OUTPATIENT
Start: 2025-06-12

## 2025-06-12 NOTE — PROGRESS NOTES
Paperwork noting that patient may bear financial responsibility for procedure(s) performed in clinic today signed prior to proceeding with procedure(s).    Furthermore, patient notified that they should contact their insurer to verify that your procedure/test has been approved and is a COVERED benefit.  Although the [facility] staff does its due diligence, the insurance carrier gives the disclaimer that \"Although the procedure is authorized, this does not guarantee payment.\"    Ultimately the patient is responsible for payment.    Botox is:  [x] Buy and Bill  [] Patient Supplied          [x] I have discussed with patient that if their insurance changes they must contact the office right away with that information so that a new prior authorization can be completed.  Patient verbalized understanding that Botox cannot be performed without a current prior authorization in place with correct insurance.

## 2025-06-12 NOTE — PATIENT INSTRUCTIONS
Refill policies:    Allow 2-3 business days for refills; controlled substances may take longer.  Contact your pharmacy at least 5 days prior to running out of medication and have them send an electronic request or submit request through the “request refill” option in your [employer] account.  Refills are not addressed on weekends; covering physicians do not authorize routine medications on weekends.  No narcotics or controlled substances are refilled after noon on Fridays or by on call physicians.  By law, narcotics must be electronically prescribed.  A 30 day supply with no refills is the maximum allowed.  If your prescription is due for a refill, you may be due for a follow up appointment.  To best provide you care, patients receiving routine medications need to be seen at least once a year.  Patients receiving narcotic/controlled substance medications need to be seen at least once every 3 months.  In the event that your preferred pharmacy does not have the requested medication in stock (e.g. Backordered), it is your responsibility to find another pharmacy that has the requested medication available.  We will gladly send a new prescription to that pharmacy at your request.    Scheduling Tests:    If your physician has ordered radiology tests such as MRI or CT scans, please contact Central Scheduling at [phone] right away to schedule the test.  Once scheduled, the [facility] Centralized Referral Team will work with your insurance carrier to obtain pre-certification or prior authorization.  Depending on your insurance carrier, approval may take 3-10 days.  It is highly recommended patients assure they have received an authorization before having a test performed.  If test is done without insurance authorization, patient may be responsible for the entire amount billed.      Precertification and Prior Authorizations:  If your physician has recommended that you have a procedure or additional testing performed the [facility]  Centralized Referral Team will contact your insurance carrier to obtain pre-certification or prior authorization.    You are strongly encouraged to contact your insurance carrier to verify that your procedure/test has been approved and is a COVERED benefit.  Although the [facility] Centralized Referral Team does its due diligence, the insurance carrier gives the disclaimer that \"Although the procedure is authorized, this does not guarantee payment.\"    Ultimately the patient is responsible for payment.   Thank you for your understanding in this matter.  Paperwork Completion:  If you require FMLA or disability paperwork for your recovery, please make sure to either drop it off or have it faxed to our office at [phone]. Be sure the form has your name and date of birth on it.  The form will be faxed to our Forms Department and they will complete it for you.  There is a 25$ fee for all forms that need to be filled out.  Please be aware there is a 10-14 day turnaround time.  You will need to sign a release of information (BRANDY) form if your paperwork does not come with one.  You may call the Forms Department with any questions at [phone].  Their fax number is [phone].
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
## 2025-06-26 ENCOUNTER — TELEPHONE (OUTPATIENT)
Dept: NEUROLOGY | Facility: CLINIC | Age: 60
End: 2025-06-26

## 2025-06-26 NOTE — TELEPHONE ENCOUNTER
S-condition update after Botox    B-being followed for migraines. Received first session of Botox 6/12/25.    A-states that she has developed left eye drooping (outer eyelid), blurred vision due to drooping, watering in left eye, feels heavy    Has tried: compress, eye drops and steri-stripping eyelid up without success.     Explained drooping can unfortunately be a side effect of Botox and will subside.    Pt wanting to see if what she is doing at home would do any damage to her eye and if there are any additional remedies or recommendations.       R- nursing will get back to patient. Verbalized understanding.

## 2025-06-26 NOTE — TELEPHONE ENCOUNTER
Pt had Botox injection two weeks ago. She is having trouble with one site, her left eye watering. She is having trouble keeping it open. Transferring call to RN.

## 2025-06-26 NOTE — TELEPHONE ENCOUNTER
Left generic message as no identifiers to give recommendations.     Dave Box MD to Zoie Rubio Nurse        6/26/25 10:38 AM  Unfortunately I don't have any further recommendations - we have to give it time.  There shouldn't be any risk to vision - however if she is having double or blurry vision not just from tears, that would be a bit unusual to be from botox.  If it doesn't improve in the next 2-3 weeks, should get an opinion from an eye doctor

## (undated) NOTE — LETTER
Date & Time: 4/12/2024, 5:23 PM  Patient: Laverne Bond  Encounter Provider(s):    Colleen King PA-C       To Whom It May Concern:    Laverne Bond was seen and treated in our department on 4/12/2024. She should not return to work until cleared by occupational health  .    If you have any questions or concerns, please do not hesitate to call.      Colleen King PA-C    _____________________________  Physician/APC Signature

## (undated) NOTE — IP AVS SNAPSHOT
[facility] [place] [address] [place], [address] ~ [phone]                Discharge Summary   5/24/2017    [address] [facility]           Admission Information        Provider Department    5/24/2017 Nina Carrasco MD  3 Take 1 capsule (300 mg total) by mouth 3 (three) times daily.     Burgess Titus                                 predniSONE 20 MG Tabs   Commonly known as:  Merary Koenig   Next dose due:  5/27/2017 Morning        60mg (3 tabs) daily for 3 days, then 40mg (2 t Commonly known as:  DIPHENHIST        Take 1 tablet (25 mg total) by mouth every 6 (six) hours as needed.     Nitin NGUYEN                           LEXAPRO 10 MG Tabs   Last time this was given:  20 mg on 5/26/2017  8:02 AM   Generic drug:  escitalopram Phone:  [phone]    - BuPROPion HCl ER (SR) 150 MG Tb12  - gabapentin 300 MG Caps  - Rizatriptan Benzoate 10 MG Tabs  - Rizatriptan Benzoate 10 MG Tbdp      Please  your prescriptions at the location directed by your doctor or nurse     Bring [facility] in [facility] in [address], [address]    [address]   [address]    [place], [address] [place] [address]:  [place] [address] ALT Bilirubin,Total Total Protein Albumin Sodium Potassium Chloride    -- -- -- -- (05/25/17)  141 (05/25/17)  3.9 (05/25/17)  113 (H)      Radiology Exams     None      Patient Belongings       Most Recent Value    All belongings returned to patient at d medical emergencies, dial 911.             _____________________________________________________________________________    Medication Side Effects - Medications to be taken at home  As your caregivers, we want you to be aware of the medications you are pr chemotherapy related nausea, used as part of some chemo protocols   Most common side effects:  Increased blood sugar, high blood pressure, fluid retention, mood changes, stomach upset, headache, dizziness   What to report to your healthcare provider: Tara Bang What to report to your healthcare team: Problems staying awake, confusion, memory problems

## (undated) NOTE — ED AVS SNAPSHOT
Mo Dunn   MRN: [id number]    Department:  [facility] Emergency Department   Date of Visit:  4/1/2019           Disclosure     Insurance plans vary and the physician(s) referred by the ER may not be covered by your plan.  Please contact tell this physician (or your personal doctor if your instructions are to return to your personal doctor) about any new or lasting problems. The primary care or specialist physician will see patients referred from the [facility] Emergency Department.  Salvadore Skiff

## (undated) NOTE — ED AVS SNAPSHOT
Deanna Bains   MRN: [id number]    Department:  [facility] Emergency Department   Date of Visit:  9/9/2019           Disclosure     Insurance plans vary and the physician(s) referred by the ER may not be covered by your plan.  Please contact you tell this physician (or your personal doctor if your instructions are to return to your personal doctor) about any new or lasting problems. The primary care or specialist physician will see patients referred from the [facility] Emergency Department.  Wing Michelle